# Patient Record
Sex: FEMALE | Race: WHITE | NOT HISPANIC OR LATINO | Employment: FULL TIME
[De-identification: names, ages, dates, MRNs, and addresses within clinical notes are randomized per-mention and may not be internally consistent; named-entity substitution may affect disease eponyms.]

---

## 2017-09-17 ENCOUNTER — HEALTH MAINTENANCE LETTER (OUTPATIENT)
Age: 37
End: 2017-09-17

## 2019-05-24 ENCOUNTER — TRANSFERRED RECORDS (OUTPATIENT)
Dept: HEALTH INFORMATION MANAGEMENT | Facility: CLINIC | Age: 39
End: 2019-05-24

## 2019-05-24 LAB
HPV ABSTRACT: NORMAL
PAP-ABSTRACT: ABNORMAL

## 2019-11-08 ENCOUNTER — HEALTH MAINTENANCE LETTER (OUTPATIENT)
Age: 39
End: 2019-11-08

## 2020-02-23 ENCOUNTER — HEALTH MAINTENANCE LETTER (OUTPATIENT)
Age: 40
End: 2020-02-23

## 2020-06-02 ENCOUNTER — OFFICE VISIT (OUTPATIENT)
Dept: FAMILY MEDICINE | Facility: CLINIC | Age: 40
End: 2020-06-02
Payer: COMMERCIAL

## 2020-06-02 VITALS
BODY MASS INDEX: 39.61 KG/M2 | SYSTOLIC BLOOD PRESSURE: 116 MMHG | TEMPERATURE: 98.3 F | WEIGHT: 209.8 LBS | HEIGHT: 61 IN | HEART RATE: 87 BPM | DIASTOLIC BLOOD PRESSURE: 78 MMHG | OXYGEN SATURATION: 98 %

## 2020-06-02 DIAGNOSIS — M79.661 PAIN OF RIGHT LOWER LEG: Primary | ICD-10-CM

## 2020-06-02 PROCEDURE — 99203 OFFICE O/P NEW LOW 30 MIN: CPT | Performed by: NURSE PRACTITIONER

## 2020-06-02 RX ORDER — NORETHINDRONE ACETATE AND ETHINYL ESTRADIOL 1MG-20(21)
1 KIT ORAL
COMMUNITY
Start: 2019-05-24 | End: 2020-07-08

## 2020-06-02 RX ORDER — AZELAIC ACID 0.15 G/G
GEL TOPICAL
COMMUNITY
Start: 2018-08-02 | End: 2020-12-15

## 2020-06-02 ASSESSMENT — MIFFLIN-ST. JEOR: SCORE: 1560.03

## 2020-06-02 NOTE — PATIENT INSTRUCTIONS
Will obtain ultrasound of the left leg to rule out blood clot.  If any worsening or new concerning symptoms (chest pain, palpitations or shortness of breath) please seek emergent.
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
## 2020-06-04 ENCOUNTER — ANCILLARY PROCEDURE (OUTPATIENT)
Dept: ULTRASOUND IMAGING | Facility: CLINIC | Age: 40
End: 2020-06-04
Attending: NURSE PRACTITIONER
Payer: COMMERCIAL

## 2020-06-04 DIAGNOSIS — M79.661 PAIN OF RIGHT LOWER LEG: ICD-10-CM

## 2020-06-04 PROCEDURE — 93971 EXTREMITY STUDY: CPT | Mod: RT
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
## 2020-07-10 ENCOUNTER — OFFICE VISIT (OUTPATIENT)
Dept: OBGYN | Facility: CLINIC | Age: 40
End: 2020-07-10
Payer: COMMERCIAL

## 2020-07-10 VITALS
OXYGEN SATURATION: 95 % | BODY MASS INDEX: 38.82 KG/M2 | HEART RATE: 79 BPM | WEIGHT: 205.6 LBS | DIASTOLIC BLOOD PRESSURE: 81 MMHG | HEIGHT: 61 IN | TEMPERATURE: 98.7 F | SYSTOLIC BLOOD PRESSURE: 132 MMHG

## 2020-07-10 DIAGNOSIS — Z30.41 ENCOUNTER FOR SURVEILLANCE OF CONTRACEPTIVE PILLS: ICD-10-CM

## 2020-07-10 DIAGNOSIS — C43.61 MALIGNANT MELANOMA OF RIGHT UPPER EXTREMITY INCLUDING SHOULDER (H): ICD-10-CM

## 2020-07-10 DIAGNOSIS — Z13.1 SCREENING FOR DIABETES MELLITUS: ICD-10-CM

## 2020-07-10 DIAGNOSIS — Z12.31 ENCOUNTER FOR SCREENING MAMMOGRAM FOR BREAST CANCER: ICD-10-CM

## 2020-07-10 DIAGNOSIS — Z13.6 CARDIOVASCULAR SCREENING; LDL GOAL LESS THAN 130: ICD-10-CM

## 2020-07-10 DIAGNOSIS — Z01.419 ENCOUNTER FOR GYNECOLOGICAL EXAMINATION WITHOUT ABNORMAL FINDING: Primary | ICD-10-CM

## 2020-07-10 LAB
CHOLEST SERPL-MCNC: 190 MG/DL
GLUCOSE SERPL-MCNC: 96 MG/DL (ref 70–99)
HDLC SERPL-MCNC: 48 MG/DL
LDLC SERPL CALC-MCNC: 108 MG/DL
NONHDLC SERPL-MCNC: 142 MG/DL
TRIGL SERPL-MCNC: 168 MG/DL

## 2020-07-10 PROCEDURE — 99396 PREV VISIT EST AGE 40-64: CPT | Performed by: NURSE PRACTITIONER

## 2020-07-10 PROCEDURE — 87624 HPV HI-RISK TYP POOLED RSLT: CPT | Performed by: NURSE PRACTITIONER

## 2020-07-10 PROCEDURE — 36415 COLL VENOUS BLD VENIPUNCTURE: CPT | Performed by: NURSE PRACTITIONER

## 2020-07-10 PROCEDURE — G0145 SCR C/V CYTO,THINLAYER,RESCR: HCPCS | Performed by: NURSE PRACTITIONER

## 2020-07-10 PROCEDURE — 82947 ASSAY GLUCOSE BLOOD QUANT: CPT | Performed by: NURSE PRACTITIONER

## 2020-07-10 PROCEDURE — 80061 LIPID PANEL: CPT | Performed by: NURSE PRACTITIONER

## 2020-07-10 RX ORDER — NORETHINDRONE ACETATE AND ETHINYL ESTRADIOL 1MG-20(21)
1 KIT ORAL DAILY
Qty: 84 TABLET | Refills: 3 | Status: SHIPPED | OUTPATIENT
Start: 2020-07-10 | End: 2021-03-29

## 2020-07-10 ASSESSMENT — PAIN SCALES - GENERAL: PAINLEVEL: NO PAIN (0)

## 2020-07-10 ASSESSMENT — MIFFLIN-ST. JEOR: SCORE: 1534.98

## 2020-07-10 NOTE — Clinical Note
Please abstract the following data from this visit with this patient into the appropriate field in Epic:    Tests that can be patient reported without a hard copy:    Pap smear done on this date: 5/24/19 (approximately), by this group: [facility], results were ASCUS and HPV neg.     Other Tests found in the patient's chart through Chart Review/Care Everywhere:    {Abstract Quality List (Optional):816307}    Note to Abstraction: If this section is blank, no results were found via Chart Review/Care Everywhere.

## 2020-07-15 LAB
COPATH REPORT: NORMAL
PAP: NORMAL

## 2020-07-17 LAB
FINAL DIAGNOSIS: NORMAL
HPV HR 12 DNA CVX QL NAA+PROBE: NEGATIVE
HPV16 DNA SPEC QL NAA+PROBE: NEGATIVE
HPV18 DNA SPEC QL NAA+PROBE: NEGATIVE
SPECIMEN DESCRIPTION: NORMAL
SPECIMEN SOURCE CVX/VAG CYTO: NORMAL

## 2020-07-21 PROBLEM — R87.612 LGSIL OF CERVIX OF UNDETERMINED SIGNIFICANCE: Status: RESOLVED | Noted: 2020-07-21 | Resolved: 2020-07-21

## 2020-07-21 PROBLEM — R87.612 LGSIL OF CERVIX OF UNDETERMINED SIGNIFICANCE: Status: ACTIVE | Noted: 2020-07-21

## 2020-07-24 ENCOUNTER — ANCILLARY PROCEDURE (OUTPATIENT)
Dept: MAMMOGRAPHY | Facility: CLINIC | Age: 40
End: 2020-07-24
Payer: COMMERCIAL

## 2020-07-24 DIAGNOSIS — Z12.31 ENCOUNTER FOR SCREENING MAMMOGRAM FOR BREAST CANCER: ICD-10-CM

## 2020-07-24 PROCEDURE — 77067 SCR MAMMO BI INCL CAD: CPT | Mod: TC

## 2020-08-21 ENCOUNTER — VIRTUAL VISIT (OUTPATIENT)
Dept: UROLOGY | Facility: CLINIC | Age: 40
End: 2020-08-21
Payer: COMMERCIAL

## 2020-08-21 DIAGNOSIS — N39.41 URGE INCONTINENCE: Primary | ICD-10-CM

## 2020-08-21 DIAGNOSIS — N39.46 MIXED INCONTINENCE: ICD-10-CM

## 2020-08-21 PROCEDURE — 99202 OFFICE O/P NEW SF 15 MIN: CPT | Mod: 95 | Performed by: PHYSICIAN ASSISTANT

## 2020-08-21 RX ORDER — MIRABEGRON 50 MG/1
50 TABLET, EXTENDED RELEASE ORAL DAILY
Qty: 30 TABLET | Refills: 11 | Status: SHIPPED | OUTPATIENT
Start: 2020-08-21 | End: 2021-10-11

## 2020-08-21 NOTE — PATIENT INSTRUCTIONS
UROLOGY CLINIC VISIT PATIENT INSTRUCTIONS    A prescription for mirabegron (Myrbetriq) was sent to your pharmacy to help with overactive bladder symptoms and urge incontinence. Take 1 pill per day at the same time every day.   -If the medication is not covered by your insurance or is expensive, please let us know so that we can send an alternative medication.     Physical Therapy Referral    Please call [phone] to make an appointment. They will also contact you to schedule most likely.     [facility] - [email or web address]  [facility] - [email or web address]    Locations:    [facility]   FS[facility] PT [facility]         Follow up in 3 months to reassess, sooner as needed.     If you have any issues, questions or concerns in the meantime, do not hesitate to contact us at [phone] or via [employer].     It was a pleasure meeting with you today.  Thank you for allowing me and my team the privilege of caring for you today.  YOU are the reason we are here, and I truly hope we provided you with the excellent service you deserve.  Please let us know if there is anything else we can do for you so that we can be sure you are leaving completely satisfied with your care experience.

## 2020-08-21 NOTE — PROGRESS NOTES
"Giselle Sauer is a 40 year old female who is being evaluated via a billable telephone visit.      The patient has been notified of following:     \"This telephone visit will be conducted via a call between you and your physician/provider. We have found that certain health care needs can be provided without the need for a physical exam.  This service lets us provide the care you need with a short phone conversation.  If a prescription is necessary we can send it directly to your pharmacy.  If lab work is needed we can place an order for that and you can then stop by our lab to have the test done at a later time.    Telephone visits are billed at different rates depending on your insurance coverage. During this emergency period, for some insurers they may be billed the same as an in-person visit.  Please reach out to your insurance provider with any questions.    If during the course of the call the physician/provider feels a telephone visit is not appropriate, you will not be charged for this service.\"    Patient has given verbal consent for Telephone visit?  Yes    What phone number would you like to be contacted at? [phone]    How would you like to obtain your AVS? Faye Helton LPN on 8/21/2020 at 11:21 AM    CC: urinary incontinence.    HPI: Ms. Giselle Sauer is a very pleasant 40 year old female who is being evaluated via billable telephone visit for urinary incontinence. This problem has been ongoing for the past year and is progressively worsening over the past 3-4 months. She describes urinary frequency, urgency, and urge incontinence. Also reports some stress incontinence with coughing, jumping, exercising, etc, though the urge incontinence is most bothersome. She has been avoiding certain types of exercise classes or going to the gym due to fear of leaking.     Ms. Sauer has tried Kegel exercises and double voiding in the past for her condition, which have not helped significantly. She " "denies any dysuria, hematuria, pyuria, hesitancy, intermittency, feeling of incomplete emptying, or any recent history of UTI's or stones. She does report some weight gain and has been trying to eat healthy and exercise in order to lose weight. She also drinks a lot of water and minimal caffeine.     She denies any pelvic pain or a vaginal bulge. Her aunt who is a nurse uses a \"ring\" to support her bladder and recommended she be evaluated by urology to discuss treatment options for her urgency and incontinence.     OBSTETRIC HISTORY:    She is , both .  Amenorrhea secondary to continuous OCP.     Past Medical History:   Diagnosis Date     Malignant melanoma (H)      Past Surgical History:   Procedure Laterality Date     C STABISM SURG,PREV EYE SURG,NOT MUSC       TONSILLECTOMY       Current Outpatient Medications   Medication Sig Dispense Refill     ASPIRIN 81 PO        azelaic acid (FINACEA) 15 % external gel APPLY TOPICALLY TO THE FACE TWICE DAILY       mirabegron (MYRBETRIQ) 50 MG 24 hr tablet Take 1 tablet (50 mg) by mouth daily 30 tablet 11     norethindrone-ethinyl estradiol (JUNEL ) 1-20 MG-MCG tablet Take 1 tablet by mouth daily 84 tablet 3     Allergies   Allergen Reactions     Sulfa Drugs Hives and Swelling     Swelling-throat       FAMILY HISTORY:   Family History   Problem Relation Age of Onset     Lipids Mother      Diabetes Maternal Grandmother        SOCIAL HISTORY:   Social History     Tobacco Use     Smoking status: Never Smoker     Smokeless tobacco: Never Used   Substance Use Topics     Alcohol use: No     Drug use: No       ROS: A comprehensive 14 point ROS was obtained and is positive for weight gain.  The remainder of the ROS is negative except for that outlined above in the HPI.      ASSESSMENT and PLAN:    Ms. Giselle Sauer is a 40 year old female with urinary frequency, urgency, and mixed urinary incontinence (urge predominant).  Different management options were discussed " today with the patient including observation, weight loss, dietary modifications, Kegel exercises, dedicated pelvic floor physical therapy with biofeedback, medication therapy including anticholinergics and Myrbetriq, pessary, or possible surgery. As she is primarily bothered by the frequency, urgency, and UUI, she elects for the following:   -Trial of mirabegron 50 mg once daily. Side effects reviewed.   -Referral to pelvic floor PT through [facility].   -Limit bladder irritants.     Follow up in 3 months to recheck, sooner as needed     Phone call duration: 24 minutes    Carole Nixon PA-C  Department of Urology

## 2020-08-24 ENCOUNTER — TELEPHONE (OUTPATIENT)
Dept: UROLOGY | Facility: CLINIC | Age: 40
End: 2020-08-24

## 2020-08-24 NOTE — TELEPHONE ENCOUNTER
8/24 Provided phone number [phone] to schedule 3 month virtual follow up around 11/24/20.       Theresa Freitas   Procedure    Ortho/Sports Med/Pod/Ent/Eye/Surgical Specialties  [facility] [place]   [phone]

## 2020-12-03 NOTE — PROGRESS NOTES
"Urology Telephone Visit - Follow-up    Reason for visit: follow up on urinary incontinence    HPI: Ms. Giselle Sauer is a very pleasant 40 year old female who is seen today via billable telephone visit for follow up of urinary incontinence. Seen in initial consultation via virtual visit on 2020. HPI from this date as follows:    Ms. Giselle Sauer is a very pleasant 40 year old female who is being evaluated via billable telephone visit for urinary incontinence. This problem has been ongoing for the past year and is progressively worsening over the past 3-4 months. She describes urinary frequency, urgency, and urge incontinence. Also reports some stress incontinence with coughing, jumping, exercising, etc, though the urge incontinence is most bothersome. She has been avoiding certain types of exercise classes or going to the gym due to fear of leaking.      Ms. Sauer has tried Kegel exercises and double voiding in the past for her condition, which have not helped significantly. She denies any dysuria, hematuria, pyuria, hesitancy, intermittency, feeling of incomplete emptying, or any recent history of UTI's or stones. She does report some weight gain and has been trying to eat healthy and exercise in order to lose weight. She also drinks a lot of water and minimal caffeine.      She denies any pelvic pain or a vaginal bulge. Her aunt who is a nurse uses a \"ring\" to support her bladder and recommended she be evaluated by urology to discuss treatment options for her urgency and incontinence.      OBSTETRIC HISTORY:    She is , both .  Amenorrhea secondary to continuous OCP.       At that time, she was started on a trial of mirabegron 50 mg daily and given a referral to pelvic floor physical therapy.     Today, she reports some improvement with the medication. No noticeable side effects. Did not attend physical therapy as she would have to pay out of pocket. Feels as though she is managing okay at this " time, though notes some worsening of her symptoms with the cold weather. Wonders about home exercises/bladder retraining techniques which apparently did not get sent to her [employer] after her last visit.     A/P  40 year old female with urinary urgency and mixed incontinence. Has noted some improvement with mirabegron. Did not attend PFPT as she would have to pay out of pocket. Feels to be managing okay at this point, but would like the information for home exercises which we discussed last time.  -Will resend bladder retraining techniques/Kegel exercise handout via [employer] and mail to ensure she gets it.  -Continue mirabegron 50 mg daily. Can always try stopping if she feels it's no longer needed.   -Follow up as needed or if symptoms continue to be bothersome in the future. If continues to have bothersome frequency, urgency, leakage, would recommend next visit be in person for exam to better assess the pelvic floor.     Phone call duration: 10 minutes    Carole Nixon PA-C  Department of Urology

## 2020-12-04 ENCOUNTER — VIRTUAL VISIT (OUTPATIENT)
Dept: UROLOGY | Facility: CLINIC | Age: 40
End: 2020-12-04
Payer: COMMERCIAL

## 2020-12-04 DIAGNOSIS — R39.15 URINARY URGENCY: ICD-10-CM

## 2020-12-04 DIAGNOSIS — N39.46 MIXED INCONTINENCE: Primary | ICD-10-CM

## 2020-12-04 PROCEDURE — 99212 OFFICE O/P EST SF 10 MIN: CPT | Mod: 95 | Performed by: PHYSICIAN ASSISTANT

## 2020-12-04 NOTE — PATIENT INSTRUCTIONS
UROLOGY CLINIC VISIT PATIENT INSTRUCTIONS    Continue mirabegron (Myrbetriq) 50 mg once daily. You can always try stopping the medication in the future if you feel that you no longer need it.    Follow up with urology if your symptoms continue to get worse or you would like to discuss other treatments going forward.     Bladder Retraining  In this packet, you will learn 3 steps to help you improve your bladder control. If you have any questions regarding any of these steps once you are home, please feel free to call the office.   The 3 steps you will learn are:   1. Double Voiding   2. Fluid Guidelines  3. Timed Voiding   Double Voiding   A technique called double voiding can be used to help ensure that you have fully emptied your bladder while on the toilet. The main idea behind double voiding is to try to void two or even three times during each trip to the bathroom.  By doing this you can reduce residual urine volumes and minimize your chance of having an accident, an infection, or leakage later on.   The technique is simple.  Some people find that they can void, remain on the toilet for a rest period of two to five to ten minutes, and void again.  Others find it useful to void, then stand up and sit back down and attempt to void again.   You can also compress the bladder in order to empty more fully.  To do the Crede maneuver, press firmly with one hand (or both hands) directly into the abdomen over the bladder.  You may also find it helpful to lean forward or rock while sitting on the toilet in order to help empty the bladder better.  Fluid Guidelines   Managing your fluid intake can also help you improve your bladder control.  It is VERY important that you drink at least 5 to 7 glasses of fluid each day. The bulk of this fluid should be water. Drinking appropriate amounts of fluid daily and emptying your bladder at regular intervals helps decrease bladder infections. Managing your problem by restricting fluid  intake is counterproductive, and NOT recommended.     Suggestions Regarding Fluid Intake    Try to spread your fluid consumption out over the course of the day rather than consuming large amounts at one sitting and then going long periods of time without drinking.     Try to minimize fluid consumption after your evening meal.     Try to minimize caffeine and alcohol consumption. Use only decaffeinated coffee, tea or soda when possible.      Timed Voiding    It might be a good idea to start this approach to managing your problem on a weekend or when you plan to be at home or near a bathroom facility. The purpose of timed voiding is to gradually:   1. increase the amount of time between emptying your bladder   2. increase the amount of fluid your bladder can hold, and hopefully,   3. diminish the sense of urgency and/or leakage associated with your problem.     Week 1 - After awakening, empty your bladder every half-hour on the hour (even if you do NOT feel the need to go). Make sure you are drinking frequently. During the night only go to the bathroom if you waken and find it necessary     Week 2 - Increase the time between emptying your bladder to once per hour, following the above fluid and night instructions.     Week 3 - Increase the time between emptying your bladder to every 1 1/2 hours, following the above fluid and night instructions.   Week 4 - Increase the time between emptying your bladder to every 2 hours, following the above fluid and night instructions.  Work up to voiding every 3 to 3   hours if you can.     If you can already hold your bladder longer than 1/2 hour, you do not need to start at Week 1. Start at the point that is appropriate for you and work up from there. Just remember to 1) increase your voiding intervals by no more than 30 minutes at a time, 2) void regularly even if you do not feel the need to go, and 3) during the night, only go to the bathroom if you waken and find it necessary.    You will be the best  of how quickly you can advance to the next step. These instructions are an outline which you can modify (for example, you may find it more comfortable to stretch from 1 to 1 1/4 hours).   You may also increase the pace of this sample schedule, depending on your individual symptoms and bladder capacity. For example, you may increase the hourly increments every 5 days, instead of every 7 days.     Don t Get Discouraged  This program works! The keys to success are self-motivation and gradual increases in the time interval between voids. If you try to progress too rapidly, you will exceed your capabilities and become frustrated.    Some Additional Behavioral Techniques to Help Bladder Control    Do not rush to the bathroom. Try to be calm and maintain control. Rushing to the bathroom can intensify the urge for the bladder to contract.    Do several quick contractions of the pelvic floor muscles. Use effort to keep from leaking. If possible, sit down for direct pressure on the pelvic floor.    Relax. If you have practiced diaphragmatic breathing in the past, use that skill to relax. (Take slow, deep breaths through your nose, and then slowly breathe out through pursed lips.) Use distraction techniques to try and make the urinary urge go away.    When you feel the urge subside, walk slowly and normally to the bathroom. You can repeat the above steps to gain control if the urge returns.    You can slowly proceed to the toilet room to empty your bladder once the urge has subsided.      Patient Education     Treating Incontinence in Women: Nonsurgical Methods     The best treatment for you will depend on the type of incontinence you have. Your symptoms, age, and any underlying problems that are found also affect your treatment. Some types of incontinence may over time require surgery. But nonsurgical treatments may be effective in many cases. Nonsurgical treatments include lifestyle changes,  muscle-strengthening exercises, and medicines.   Nonsurgical treatments  Treatment for stress urinary incontinence includes:     Bladder training    Lifestyle changes such as weight loss and increased activity if incontinence is due to being overweight    Medicines, if bladder training has not helped    Pelvic floor muscle exercises  Lifestyle changes    Losing weight. Excess weight puts extra pressure on the pelvic floor muscles. Exercising and eating right can help you lose weight. This helps other treatments work better.    Making certain diet changes. Some foods may make you need to urinate more, so it may be good not to have them. These include caffeinated drinks and alcohol. Ask your healthcare provider if these or other diet changes might be helpful.    Quitting smoking. Smoking can lead to a long-term (chronic) cough that strains pelvic floor muscles. Smoking may also damage the bladder and urethra.    Pelvic floor muscle exercises  There are exercises you can do to help strengthen your pelvic floor muscles. The pelvic floor muscles act as a sling to help hold the bladder and urethra in place. These muscles also help keep the urethra closed. Weak pelvic floor muscles may allow urine to leak. To strengthen the pelvic floor muscles, do the exercises daily. In a few months, the muscles will be stronger and tighter. This can help prevent urine leakage.   [employer] last reviewed this educational content on 9/1/2019 2000-2020 [employer]. [address] [place] [address], [place] [address]. All rights reserved. This information is not intended as a substitute for professional medical care. Always follow your healthcare professional's instructions.           Patient Education     Kegel Exercises  Kegel exercises don t need special clothing or equipment. They re easy to learn and simple to do. And if you do them right, no one can tell you re doing them, so they can be done almost anywhere. Your healthcare  provider, nurse, or physical therapist can answer any questions you have and help you get started.    A weak pelvic floor  The pelvic floor muscles may weaken due to aging, pregnancy and vaginal childbirth, injury, surgery, chronic cough, or lack of exercise. If the pelvic floor is weak, your bladder and other pelvic organs may sag out of place. The urethra may also open too easily and allow urine to leak out. Kegel exercises can help you strengthen your pelvic floor muscles. Then they can better support the pelvic organs and control urine flow.  How Kegel exercises are done  Try each of the Kegel exercises described below. When you re doing them, try not to move your leg, buttock, or stomach muscles:    Contract as if you were stopping your urine stream. But do it when you re not urinating.    Tighten your rectum as if trying not to pass gas. Contract your anus, but don t move your buttocks.    You may place a finger or 2 in the vagina and squeeze your finger with your vagina to learn which muscles to tighten.  Try to hold each Kegel for a slow count to 5. You probably won t be able to hold them for that long at first. But keep practicing. It will get easier as your pelvic floor gets stronger. Eventually, special weights that you place in your vagina may be recommended to help make your Kegels even more effective. Visit your healthcare provider if you have difficulties doing Kegel exercises.  Helpful hints  Here are some tips to follow:    Do your Kegels as often as you can. The more you do them, the faster you ll feel the results.    Pick an activity you do often as a reminder. For instance, do your Kegels every time you sit down.    Tighten your pelvic floor before you sneeze, get up from a chair, cough, laugh, or lift. This protects your pelvic floor from injury and can help prevent urine leakage.   [employer] last reviewed this educational content on 10/1/2017    6725-4821 [employer]. [address] [place] [address]  [facility], [place] [address]. All rights reserved. This information is not intended as a substitute for professional medical care. Always follow your healthcare professional's instructions.           If you have any issues, questions or concerns in the meantime, do not hesitate to contact us at [phone] or via [employer].     It was a pleasure meeting with you today.  Thank you for allowing me and my team the privilege of caring for you today.  YOU are the reason we are here, and I truly hope we provided you with the excellent service you deserve.  Please let us know if there is anything else we can do for you so that we can be sure you are leaving completely satisfied with your care experience.

## 2020-12-04 NOTE — PROGRESS NOTES
"Giselle Sauer is a 40 year old female who is being evaluated via a billable telephone visit.      The patient has been notified of following:     \"This telephone visit will be conducted via a call between you and your physician/provider. We have found that certain health care needs can be provided without the need for a physical exam.  This service lets us provide the care you need with a short phone conversation.  If a prescription is necessary we can send it directly to your pharmacy.  If lab work is needed we can place an order for that and you can then stop by our lab to have the test done at a later time.    Telephone visits are billed at different rates depending on your insurance coverage. During this emergency period, for some insurers they may be billed the same as an in-person visit.  Please reach out to your insurance provider with any questions.    If during the course of the call the physician/provider feels a telephone visit is not appropriate, you will not be charged for this service.\"    Patient has given verbal consent for Telephone visit?  Yes    What phone number would you like to be contacted at? [phone]    How would you like to obtain your AVS? Faye Helms CMA        "

## 2020-12-15 ENCOUNTER — OFFICE VISIT (OUTPATIENT)
Dept: DERMATOLOGY | Facility: CLINIC | Age: 40
End: 2020-12-15
Attending: NURSE PRACTITIONER
Payer: COMMERCIAL

## 2020-12-15 DIAGNOSIS — L81.4 SOLAR LENTIGO: ICD-10-CM

## 2020-12-15 DIAGNOSIS — D18.01 CHERRY ANGIOMA: ICD-10-CM

## 2020-12-15 DIAGNOSIS — D22.9 MULTIPLE BENIGN NEVI: ICD-10-CM

## 2020-12-15 DIAGNOSIS — L71.9 ROSACEA: ICD-10-CM

## 2020-12-15 DIAGNOSIS — L57.8 SUN-DAMAGED SKIN: ICD-10-CM

## 2020-12-15 DIAGNOSIS — Z85.820 HISTORY OF MELANOMA: Primary | ICD-10-CM

## 2020-12-15 PROCEDURE — 99203 OFFICE O/P NEW LOW 30 MIN: CPT | Performed by: DERMATOLOGY

## 2020-12-15 RX ORDER — AZELAIC ACID 0.15 G/G
GEL TOPICAL
Qty: 50 G | Refills: 11 | Status: SHIPPED | OUTPATIENT
Start: 2020-12-15 | End: 2021-10-11

## 2020-12-15 ASSESSMENT — PAIN SCALES - GENERAL: PAINLEVEL: NO PAIN (0)

## 2020-12-15 NOTE — PROGRESS NOTES
"[facility] Dermatology Note    Dermatology Problem List:  Needs yearly skin checks  1. Melanoma, right upper arm, stage IA, excised 9/10/15  -Treated at [facility]. Breslows depth 0.4mm, no ulceration, 0 mitoses/mm2  2. Atypical nevi history  -No biopsy reports. Perhaps clinically identified.  3. Rosacea  - Refilled azelaic acid 15% gel 12/15/2020  4. History of benign biopsy  -Right upper arm, dermatofibroma, s/p punch biopsy 2/10/17    Encounter Date: Dec 15, 2020    CC:  Chief Complaint   Patient presents with     Derm Problem     Giselle is here today for a skin check, pt states \"concern on back by bra line, possibly new spots in scar\"       History of Present Illness:  Ms. Giselle Sauer is a 40 year old female with history of stage IA melanoma on the right upper arm in 2015 presents for skin check.    She is new to our department. She was seen at / previously. Last skin check 11/1/2019. History of melanoma as stated above on right upper arm. No other skin cancer or atypical mole history to her knowledge.    Today, she notes concerns about a spot on her shoulder. She thinks this could be a new spot in a previous surgical scar. It is pink in color. Her children thought one mole on her back might look atypical, but she cannot see it to know for sure.    Denies any tender, nonhealing, bleeding, or changing pigmented lesions.    Feeling well today, no fevers, chills, cough, loss of appetite. She notes she has seen PCP for shortness of breath over last 18 months. Workup ongoing.    Requests refills of finacea today for rosacea. This typically works well for her.    No other concerns addressed today.    Past Medical History:   Patient Active Problem List   Diagnosis     CARDIOVASCULAR SCREENING; LDL GOAL LESS THAN 160     Obesity     Malignant melanoma (H)     Past Medical History:   Diagnosis Date     Malignant melanoma (H)      Past Surgical History:   Procedure Laterality Date     C STABISM " SURG,PREV EYE SURG,NOT MUSC       TONSILLECTOMY         Social History:  Patient reports that she has never smoked. She has never used smokeless tobacco. She reports that she does not drink alcohol or use drugs.   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 Has an 19 yo and 15 yo.    Family History:  Negative for skin cancer.    Medications:  Current Outpatient Medications   Medication Sig Dispense Refill     ASPIRIN 81 PO Take by mouth daily        azelaic acid (FINACEA) 15 % external gel APPLY TOPICALLY TO THE FACE TWICE DAILY       mirabegron (MYRBETRIQ) 50 MG 24 hr tablet Take 1 tablet (50 mg) by mouth daily 30 tablet 11     norethindrone-ethinyl estradiol (JUNEL FE 1/20) 1-20 MG-MCG tablet Take 1 tablet by mouth daily 84 tablet 3       Allergies   Allergen Reactions     Sulfa Drugs Hives and Swelling     Swelling-throat       Review of Systems:  -Constitutional: The patient is feeling generally well. Occasional shortness of breath past 16 months. No fevers/chills, cough, unintended weight loss.  -Skin: As above in HPI. No additional skin concerns.    Physical exam:  Vitals: There were no vitals taken for this visit.  GEN: This is a well developed, well-nourished female in no acute distress, in a pleasant mood.    SKIN: Total skin excluding the undergarment areas was performed. The exam included the head/face, neck, both arms, chest, back, abdomen, both legs, buttocks, digits and/or nails. Declined genital concrns.  - Cuellar Type II  - Well healed linear scar on right upper arm. There is a visible telangiectasias in central part of the scar. No nodularity (patient concern).  - Scattered brown macules on sun exposed areas.  - Clinically apparent telangiectasias on the cheek and chin.  - There are dome shaped bright red papules on the trunk.  - Multiple regular brown pigmented macules and papules are identified across the body. About 40 nevi in all. Specifically on the back,  many have congenital appearance to them with more patchy reticular network. None have significant enough atypia to warrant biopsy today.  - There is a firm tan/flesh colored papule that dimples with lateral pressure on the left thigh.  - No other lesions of concern on areas examined.   LYMPH NODES: No submandibular, cervical, supraclavicular, axillary, or inguinal lymphadenopathy palpable on examination.       Impression/Plan:    1. Melanoma, stage IA, right upper arm. No evidence of recurrence. Discussed risk of melanoma recurrence (with local or distant disease) and risk of additional primary melanomas. Explained routine schedule for follow up examinations in office and need for self skin checks monthly.  - ABCDs of melanoma were discussed and self skin checks were advised.   - Sun precaution was advised including the use of sunscreens of SPF 30 or higher, sun protective clothing, and avoidance of tanning beds.  - Recommended yearly dental, gyn, and ophthalmology examinations.  - Recommended first degree relatives get yearly skin exams as well.    2. Sun damaged skin with solar lentigines  - Recommend sunscreens SPF #30 or greater, protective clothing and avoidance of tanning beds.    3. Benign findings including: cherry angioma, dermatofibroma  - No further intervention required. Patient to report changes.   - Patient reassured of the benign nature of these lesions.    4. Multiple clinically benign nevi  - No further intervention required. Patient to report changes.   - Patient reassured of the benign nature of these lesions.    5. Rosacea  - Refilled azelaic acid 15% gel. Discussed that insurance coverage of this has been poor lately. Recommended 10% OTC versions if not covered.    CC KARYNA Coffey CNP  [address] [place] [address] on close of this encounter.  Follow-up in 1 year for annual skin check, earlier for new or changing lesions.     Staff Involved:  Staff and scribe    Scribe  Disclosure:   I, Say Méndez, am serving as a scribe to document services personally performed by this physician, Dr. Kaitlynn Burnham, based on data collection and the provider's statements to me.     Provider Disclosure:   The documentation recorded by the scribe accurately reflects the services I personally performed and the decisions made by me.    Kaitlynn Burnham MD    Department of Dermatology  [facility]: Phone: [phone], Fax:[phone]  [facility]: Phone: [phone], Fax: [phone]

## 2020-12-15 NOTE — PATIENT INSTRUCTIONS
- We have refilled your Finacea for rosacea.  - If Finacea is not covered by insurance, you can purchase OTC azelaic acid. Some brands include Shelby's Choice, which you can get from [employer].    - I recommend informing your dentist, , OB and ophthalmologist of your previous melanoma diagnosis.  - Also, recommend having your children get yearly skin exams when they turn 18. Your siblings should also get yearly skin exams.

## 2020-12-15 NOTE — NURSING NOTE
"Giselle SARAH Sauer's goals for this visit include:   Chief Complaint   Patient presents with     Derm Problem     Giselle is here today for a skin check, pt states \"concern on back by bra line, possibly new spots in scar\"       She requests these members of her care team be copied on today's visit information:     PCP: No Ref-Primary, Physician    Referring Provider:  KARYNA Coffey CNP  [address] [place] [address]    There were no vitals taken for this visit.    Do you need any medication refills at today's visit? No    January Donaldson LPN      "
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
## 2021-03-26 DIAGNOSIS — Z30.41 ENCOUNTER FOR SURVEILLANCE OF CONTRACEPTIVE PILLS: ICD-10-CM

## 2021-03-26 RX ORDER — NORETHINDRONE ACETATE/ETHINYL ESTRADIOL AND FERROUS FUMARATE 1MG-20(21)
KIT ORAL
OUTPATIENT
Start: 2021-03-26

## 2021-03-26 NOTE — TELEPHONE ENCOUNTER
"Requested Prescriptions   Pending Prescriptions Disp Refills     EMILE FE 1/20 1-20 MG-MCG tablet [Pharmacy Med Name: EMILE FE 1/20 1-20MG-MCG TABS] 84 tablet 3     Sig: TAKE ONE TABLET BY MOUTH ONCE DAILY       Contraceptives Protocol Passed - 3/26/2021  1:30 PM        Passed - Patient is not a current smoker if age is 35 or older        Passed - Recent (12 mo) or future (30 days) visit within the authorizing provider's specialty     Patient has had an office visit with the authorizing provider or a provider within the authorizing providers department within the previous 12 mos or has a future within next 30 days. See \"Patient Info\" tab in inbasket, or \"Choose Columns\" in Meds & Orders section of the refill encounter.              Passed - Medication is active on med list        Passed - No active pregnancy on record        Passed - No positive pregnancy test in past 12 months           Last seen 7/10/2020 for annual exam    Last prescribed 7/10/2020 for 84 tablets with 3 refills.    Refills remain at patient's pharmacy. Refill not appropriate at this time, because there are additional refills remaining on current presciption    RN unable to close encounter w/o declining medication. RN routing to provider to close encounter.    Carolina Malik RN on 3/26/2021 at 1:36 PM        "

## 2021-03-29 DIAGNOSIS — Z30.41 ENCOUNTER FOR SURVEILLANCE OF CONTRACEPTIVE PILLS: ICD-10-CM

## 2021-03-29 RX ORDER — NORETHINDRONE ACETATE AND ETHINYL ESTRADIOL 1MG-20(21)
1 KIT ORAL DAILY
Qty: 84 TABLET | Refills: 1 | Status: SHIPPED | OUTPATIENT
Start: 2021-03-29 | End: 2021-08-02

## 2021-03-29 NOTE — TELEPHONE ENCOUNTER
The script dated 7/13/20 was filled 7/13/20 #84 for 84 days, 9/15/20 #84 for 84 days, 11/18/20 #84 for 84 days and 1/20/21 #84 for 84 days. Patient states she will be out of the end of this week.      Carole Andersonbie    Pharmacy Technician  [facility]

## 2021-08-02 DIAGNOSIS — Z30.41 ENCOUNTER FOR SURVEILLANCE OF CONTRACEPTIVE PILLS: ICD-10-CM

## 2021-08-02 RX ORDER — NORETHINDRONE ACETATE/ETHINYL ESTRADIOL AND FERROUS FUMARATE 1MG-20(21)
KIT ORAL
Qty: 84 TABLET | Refills: 0 | Status: SHIPPED | OUTPATIENT
Start: 2021-08-02 | End: 2023-11-10

## 2021-08-02 NOTE — TELEPHONE ENCOUNTER
"Requested Prescriptions   Pending Prescriptions Disp Refills     EMILE FE 1/20 1-20 MG-MCG tablet [Pharmacy Med Name: EMILE FE 1/20 1-20MG-MCG TABS] 84 tablet 1     Sig: TAKE 1 TABLET BY MOUTH DAILY       Contraceptives Protocol Passed - 8/2/2021  8:52 AM        Passed - Patient is not a current smoker if age is 35 or older        Passed - Recent (12 mo) or future (30 days) visit within the authorizing provider's specialty     Patient has had an office visit with the authorizing provider or a provider within the authorizing providers department within the previous 12 mos or has a future within next 30 days. See \"Patient Info\" tab in inbasket, or \"Choose Columns\" in Meds & Orders section of the refill encounter.              Passed - Medication is active on med list        Passed - No active pregnancy on record        Passed - No positive pregnancy test in past 12 months           Pt last seen 7/10/2020 for annual exam    Last prescribed 3/29/2021 for 84 tablets with 1 refill.    Pt is due for annual exam, has appt scheduled for 8/13/2021.    RN sent mychart to pt asking if she will be out of medication before this appt. Pt states she will run out of medication on 8/7/2021. RN sent tawana refill to get pt to her appt.    Carolina Malik RN on 8/2/2021 at 9:08 AM          "

## 2021-09-25 ENCOUNTER — HEALTH MAINTENANCE LETTER (OUTPATIENT)
Age: 41
End: 2021-09-25

## 2021-10-11 ENCOUNTER — OFFICE VISIT (OUTPATIENT)
Dept: OBGYN | Facility: CLINIC | Age: 41
End: 2021-10-11
Payer: COMMERCIAL

## 2021-10-11 VITALS
HEART RATE: 75 BPM | DIASTOLIC BLOOD PRESSURE: 77 MMHG | HEIGHT: 61 IN | TEMPERATURE: 99.1 F | SYSTOLIC BLOOD PRESSURE: 147 MMHG | BODY MASS INDEX: 41.39 KG/M2 | WEIGHT: 219.2 LBS | OXYGEN SATURATION: 97 %

## 2021-10-11 DIAGNOSIS — Z01.419 ENCOUNTER FOR GYNECOLOGICAL EXAMINATION WITHOUT ABNORMAL FINDING: Primary | ICD-10-CM

## 2021-10-11 DIAGNOSIS — Z30.41 ENCOUNTER FOR SURVEILLANCE OF CONTRACEPTIVE PILLS: ICD-10-CM

## 2021-10-11 DIAGNOSIS — Z13.6 CARDIOVASCULAR SCREENING; LDL GOAL LESS THAN 130: ICD-10-CM

## 2021-10-11 DIAGNOSIS — Z12.31 BREAST CANCER SCREENING BY MAMMOGRAM: ICD-10-CM

## 2021-10-11 DIAGNOSIS — Z11.59 NEED FOR HEPATITIS C SCREENING TEST: ICD-10-CM

## 2021-10-11 DIAGNOSIS — Z13.1 SCREENING FOR DIABETES MELLITUS: ICD-10-CM

## 2021-10-11 PROCEDURE — 99396 PREV VISIT EST AGE 40-64: CPT | Performed by: NURSE PRACTITIONER

## 2021-10-11 RX ORDER — NORETHINDRONE ACETATE AND ETHINYL ESTRADIOL 1MG-20(21)
1 KIT ORAL DAILY
Qty: 84 TABLET | Refills: 3 | Status: CANCELLED | OUTPATIENT
Start: 2021-10-11

## 2021-10-11 RX ORDER — ACETAMINOPHEN AND CODEINE PHOSPHATE 120; 12 MG/5ML; MG/5ML
0.35 SOLUTION ORAL DAILY
Qty: 84 TABLET | Refills: 3 | Status: SHIPPED | OUTPATIENT
Start: 2021-10-11 | End: 2022-09-07

## 2021-10-11 ASSESSMENT — PAIN SCALES - GENERAL: PAINLEVEL: NO PAIN (0)

## 2021-10-11 ASSESSMENT — MIFFLIN-ST. JEOR: SCORE: 1591.66
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
## 2022-09-05 DIAGNOSIS — Z30.41 ENCOUNTER FOR SURVEILLANCE OF CONTRACEPTIVE PILLS: ICD-10-CM

## 2022-09-07 RX ORDER — ACETAMINOPHEN AND CODEINE PHOSPHATE 120; 12 MG/5ML; MG/5ML
0.35 SOLUTION ORAL DAILY
Qty: 84 TABLET | Refills: 0 | Status: SHIPPED | OUTPATIENT
Start: 2022-09-07 | End: 2022-11-30

## 2022-09-07 NOTE — TELEPHONE ENCOUNTER
"Requested Prescriptions   Pending Prescriptions Disp Refills     norethindrone (MICRONOR) 0.35 MG tablet [Pharmacy Med Name: NORETHINDRONE 0.35MG TABS] 84 tablet 3     Sig: TAKE 1 TABLET (0.35 MG) BY MOUTH DAILY       Contraceptives Protocol Passed - 9/5/2022  4:25 PM        Passed - Patient is not a current smoker if age is 35 or older        Passed - Recent (12 mo) or future (30 days) visit within the authorizing provider's specialty     Patient has had an office visit with the authorizing provider or a provider within the authorizing providers department within the previous 12 mos or has a future within next 30 days. See \"Patient Info\" tab in inbasket, or \"Choose Columns\" in Meds & Orders section of the refill encounter.              Passed - Medication is active on med list        Passed - No active pregnancy on record        Passed - No positive pregnancy test in past 12 months           Last Written Prescription Date:  10/11/21  Last Fill Quantity: 84,  # refills: 3   Last office visit: 10/11/2021 with prescribing provider:  Whitley   Future Office Visit:   Next 5 appointments (look out 90 days)    Sep 29, 2022 12:45 PM  (Arrive by 12:30 PM)  Return Visit with KARYNA Faustin [facility] ([facility] [address] [facility] [address]  [phone]           Prescription approved per [facility] Refill Protocol.    Andreea Ashley RN on 9/7/2022 at 1:47 PM        "

## 2022-11-29 ENCOUNTER — MYC MEDICAL ADVICE (OUTPATIENT)
Dept: OBGYN | Facility: CLINIC | Age: 42
End: 2022-11-29

## 2022-11-29 DIAGNOSIS — Z30.41 ENCOUNTER FOR SURVEILLANCE OF CONTRACEPTIVE PILLS: ICD-10-CM

## 2022-11-29 NOTE — TELEPHONE ENCOUNTER
"Requested Prescriptions   Pending Prescriptions Disp Refills     norethindrone (MICRONOR) 0.35 MG tablet [Pharmacy Med Name: NORETHINDRONE 0.35MG TABS] 84 tablet 0     Sig: TAKE 1 TABLET (0.35 MG) BY MOUTH DAILY       Contraceptives Protocol Failed - 11/29/2022 12:15 PM        Failed - Recent (12 mo) or future (30 days) visit within the authorizing provider's specialty     Patient has had an office visit with the authorizing provider or a provider within the authorizing providers department within the previous 12 mos or has a future within next 30 days. See \"Patient Info\" tab in inbasket, or \"Choose Columns\" in Meds & Orders section of the refill encounter.              Passed - Patient is not a current smoker if age is 35 or older        Passed - Medication is active on med list        Passed - No active pregnancy on record        Passed - No positive pregnancy test in past 12 months           Pt's last yearly physical was on 10/11/21.      Sending pt a mychart reminder that she is due for a yearly physical.    Josselyn Poe RN    "

## 2022-11-30 RX ORDER — ACETAMINOPHEN AND CODEINE PHOSPHATE 120; 12 MG/5ML; MG/5ML
0.35 SOLUTION ORAL DAILY
Qty: 84 TABLET | Refills: 0 | Status: SHIPPED | OUTPATIENT
Start: 2022-11-30 | End: 2023-02-21

## 2022-11-30 NOTE — TELEPHONE ENCOUNTER
Medication is being filled for 1 time refill only due to:  Patient needs to be seen because it has been more than one year since last visit.  Pt is scheduled for an appt in January, 2023.    Josselyn Poe RN

## 2022-11-30 NOTE — TELEPHONE ENCOUNTER
Pt read the [employer] reminder to schedule an appt yesterday but she has not yet scheduled.    Josselyn Poe RN

## 2023-02-21 DIAGNOSIS — Z30.41 ENCOUNTER FOR SURVEILLANCE OF CONTRACEPTIVE PILLS: ICD-10-CM

## 2023-02-21 RX ORDER — ACETAMINOPHEN AND CODEINE PHOSPHATE 120; 12 MG/5ML; MG/5ML
0.35 SOLUTION ORAL DAILY
Qty: 84 TABLET | Refills: 0 | Status: SHIPPED | OUTPATIENT
Start: 2023-02-21 | End: 2023-05-17

## 2023-02-21 NOTE — TELEPHONE ENCOUNTER
"Requested Prescriptions   Pending Prescriptions Disp Refills     norethindrone (MICRONOR) 0.35 MG tablet [Pharmacy Med Name: NORETHINDRONE 0.35MG TABS] 84 tablet 0     Sig: TAKE 1 TABLET (0.35 MG) BY MOUTH DAILY       Contraceptives Protocol Failed - 2/21/2023  4:28 AM        Failed - Recent (12 mo) or future (30 days) visit within the authorizing provider's specialty     Patient has had an office visit with the authorizing provider or a provider within the authorizing providers department within the previous 12 mos or has a future within next 30 days. See \"Patient Info\" tab in inbasket, or \"Choose Columns\" in Meds & Orders section of the refill encounter.              Passed - Patient is not a current smoker if age is 35 or older        Passed - Medication is active on med list        Passed - No active pregnancy on record        Passed - No positive pregnancy test in past 12 months           Pt has not been seen since 10/11/21.  She was scheduled on 1/27/23 but no showed the appt.  However, pt is scheduled for a physical with Whitley on 4/14/23.    Medication is being filled for 1 time refill only due to:  Patient needs to be seen because it has been more than one year since last visit.    Josselyn Poe RN        "

## 2023-04-22 ENCOUNTER — HEALTH MAINTENANCE LETTER (OUTPATIENT)
Age: 43
End: 2023-04-22

## 2023-05-17 DIAGNOSIS — Z30.41 ENCOUNTER FOR SURVEILLANCE OF CONTRACEPTIVE PILLS: ICD-10-CM

## 2023-05-17 RX ORDER — ACETAMINOPHEN AND CODEINE PHOSPHATE 120; 12 MG/5ML; MG/5ML
0.35 SOLUTION ORAL DAILY
Qty: 84 TABLET | Refills: 0 | Status: SHIPPED | OUTPATIENT
Start: 2023-05-17 | End: 2023-08-10

## 2023-05-17 NOTE — TELEPHONE ENCOUNTER
"Requested Prescriptions   Pending Prescriptions Disp Refills     norethindrone (MICRONOR) 0.35 MG tablet [Pharmacy Med Name: NORETHINDRONE 0.35MG TABS] 84 tablet 0     Sig: TAKE 1 TABLET (0.35 MG) BY MOUTH DAILY       Contraceptives Protocol Failed - 5/17/2023  4:17 PM        Failed - Recent (12 mo) or future (30 days) visit within the authorizing provider's specialty     Patient has had an office visit with the authorizing provider or a provider within the authorizing providers department within the previous 12 mos or has a future within next 30 days. See \"Patient Info\" tab in inbasket, or \"Choose Columns\" in Meds & Orders section of the refill encounter.              Passed - Patient is not a current smoker if age is 35 or older        Passed - Medication is active on med list        Passed - No active pregnancy on record        Passed - No positive pregnancy test in past 12 months           Pt has not had a yearly physical with Whitley Pettit, since 10/11/21.    Pt is scheduled for a yearly physical with Whitley in June of this year.    Prescription approved per [facility] Refill Protocol.    Josselyn Poe RN          "

## 2023-08-10 ENCOUNTER — MYC MEDICAL ADVICE (OUTPATIENT)
Dept: OBGYN | Facility: CLINIC | Age: 43
End: 2023-08-10
Payer: COMMERCIAL

## 2023-08-10 DIAGNOSIS — Z30.41 ENCOUNTER FOR SURVEILLANCE OF CONTRACEPTIVE PILLS: ICD-10-CM

## 2023-08-10 RX ORDER — ACETAMINOPHEN AND CODEINE PHOSPHATE 120; 12 MG/5ML; MG/5ML
0.35 SOLUTION ORAL DAILY
Qty: 28 TABLET | Refills: 0 | Status: SHIPPED | OUTPATIENT
Start: 2023-08-10 | End: 2024-07-15

## 2023-08-10 NOTE — TELEPHONE ENCOUNTER
Pt is leaving [facility] on 8/11/23 and is in need of this before she leaves.  If ok'd please send new rx to [facility].    Thank you,  Nilsa Coronado, [facility]  [phone]

## 2023-10-26 ENCOUNTER — ANCILLARY PROCEDURE (OUTPATIENT)
Dept: MAMMOGRAPHY | Facility: CLINIC | Age: 43
End: 2023-10-26
Attending: NURSE PRACTITIONER
Payer: COMMERCIAL

## 2023-10-26 DIAGNOSIS — Z12.31 VISIT FOR SCREENING MAMMOGRAM: ICD-10-CM

## 2023-10-26 PROCEDURE — 77067 SCR MAMMO BI INCL CAD: CPT | Mod: TC | Performed by: RADIOLOGY

## 2023-10-26 PROCEDURE — 77063 BREAST TOMOSYNTHESIS BI: CPT | Mod: TC | Performed by: RADIOLOGY

## 2023-11-10 ENCOUNTER — OFFICE VISIT (OUTPATIENT)
Dept: FAMILY MEDICINE | Facility: CLINIC | Age: 43
End: 2023-11-10
Payer: COMMERCIAL

## 2023-11-10 VITALS
BODY MASS INDEX: 45.31 KG/M2 | DIASTOLIC BLOOD PRESSURE: 81 MMHG | HEART RATE: 86 BPM | WEIGHT: 240 LBS | TEMPERATURE: 98 F | OXYGEN SATURATION: 99 % | SYSTOLIC BLOOD PRESSURE: 146 MMHG | HEIGHT: 61 IN

## 2023-11-10 DIAGNOSIS — Z01.818 PREOP GENERAL PHYSICAL EXAM: Primary | ICD-10-CM

## 2023-11-10 DIAGNOSIS — Z82.49 FAMILY HISTORY OF THROMBOSIS IN FIRST DEGREE RELATIVE: ICD-10-CM

## 2023-11-10 LAB
BASOPHILS # BLD AUTO: 0 10E3/UL (ref 0–0.2)
BASOPHILS NFR BLD AUTO: 0 %
EOSINOPHIL # BLD AUTO: 0.1 10E3/UL (ref 0–0.7)
EOSINOPHIL NFR BLD AUTO: 1 %
ERYTHROCYTE [DISTWIDTH] IN BLOOD BY AUTOMATED COUNT: 13 % (ref 10–15)
FACTOR V INTERPRETATION: NORMAL
HCT VFR BLD AUTO: 43.4 % (ref 35–47)
HGB BLD-MCNC: 14.3 G/DL (ref 11.7–15.7)
IMM GRANULOCYTES # BLD: 0 10E3/UL
IMM GRANULOCYTES NFR BLD: 0 %
LAB DIRECTOR COMMENTS: NORMAL
LAB DIRECTOR DISCLAIMER: NORMAL
LAB DIRECTOR INTERPRETATION: NORMAL
LAB DIRECTOR METHODOLOGY: NORMAL
LAB DIRECTOR RESULTS: NORMAL
LYMPHOCYTES # BLD AUTO: 2.6 10E3/UL (ref 0.8–5.3)
LYMPHOCYTES NFR BLD AUTO: 26 %
MCH RBC QN AUTO: 28.5 PG (ref 26.5–33)
MCHC RBC AUTO-ENTMCNC: 32.9 G/DL (ref 31.5–36.5)
MCV RBC AUTO: 87 FL (ref 78–100)
MONOCYTES # BLD AUTO: 0.8 10E3/UL (ref 0–1.3)
MONOCYTES NFR BLD AUTO: 8 %
NEUTROPHILS # BLD AUTO: 6.5 10E3/UL (ref 1.6–8.3)
NEUTROPHILS NFR BLD AUTO: 65 %
PLATELET # BLD AUTO: 270 10E3/UL (ref 150–450)
RBC # BLD AUTO: 5.02 10E6/UL (ref 3.8–5.2)
SPECIMEN DESCRIPTION: NORMAL
WBC # BLD AUTO: 10 10E3/UL (ref 4–11)

## 2023-11-10 PROCEDURE — 80053 COMPREHEN METABOLIC PANEL: CPT | Performed by: INTERNAL MEDICINE

## 2023-11-10 PROCEDURE — 2894A FACTOR 5 LEIDEN MUTATION ANALYSIS: CPT | Performed by: PATHOLOGY

## 2023-11-10 PROCEDURE — 81241 F5 GENE: CPT | Performed by: INTERNAL MEDICINE

## 2023-11-10 PROCEDURE — 99207 PR NO CHARGE LOS: CPT | Performed by: INTERNAL MEDICINE

## 2023-11-10 PROCEDURE — 36415 COLL VENOUS BLD VENIPUNCTURE: CPT | Performed by: INTERNAL MEDICINE

## 2023-11-10 PROCEDURE — 85025 COMPLETE CBC W/AUTO DIFF WBC: CPT | Performed by: INTERNAL MEDICINE

## 2023-11-10 ASSESSMENT — PAIN SCALES - GENERAL: PAINLEVEL: SEVERE PAIN (6)

## 2023-11-10 NOTE — COMMUNITY RESOURCES LIST (ENGLISH)
11/10/2023   [facility]  N/A  For questions about this resource list or additional care needs, please contact your primary care clinic or care manager.  Phone: [phone]   Email: N/A   Address: [facility] [place] [address]   Hours: N/A        Transportation       Free or low-cost transportation  1  [employer] Distance: 8.11 miles      In-Person   [address] [place] [address]  Language: English  Hours: Mon - Fri 6:00 AM - 6:00 PM  Fees: Insurance, Self Pay   Phone: (880) 126-6593 Email: info@Gl[employer] Website: http://www.AboutOne     2  [facility] - Bus [employer] - Free or low-cost transportation Distance: 19.91 miles      In-Person   [address] [place] [address]  Language: English  Hours: Tue 9:30 AM - 11:30 AM , Thu 9:30 AM - 11:30 AM  Fees: Free   Phone: (951) 153-2691 Email: info@[employer] Website: http://www.Gu[employer] Systems/     Transportation to medical appointments  3  [employer] Distance: 8.11 miles      In-Person   [address] [place] [address]  Language: English  Hours: Mon - Fri 6:00 AM - 6:00 PM  Fees: Insurance, Self Pay   Phone: (807) 231-4013 Email: info@[employer] Website: http://www.Ad Summos.Concurrent Thinking     4  [facility]  [employer] ([employer]) Distance: 12.92 miles      In-Person   [address] [place] [address]  Language: Tajik, Romansh, English, Gujarati, Melissa, Mohawk, Portuguese, Nepali, Kiswahili, Mohawk  Hours: Mon - Wed 9:00 AM - 5:00 PM , Thu 12:00 PM - 6:00 PM , Fri 9:00 AM - 5:00 PM , Sun 10:30 AM - 2:00 PM Appt. Only  Fees: Free   Phone: (731) 433-2513 Email: info@sew-aifw.org Website: https://www.BetterDoctor.org/          Important Numbers & Websites       Emergency Services   911  [facility]   [facility]  Poison Control   (794) 986-9081  Suicide Prevention Lifeline   (397) 863-9111 (TALK)  Child Abuse Hotline   (834)  619-5419 (4-A-Child)  Sexual Assault Hotline   (820) 895-3327 (HOPE)  National Runaway Safeline   (957) 980-1773 (RUNAWAY)  All-Options Talkline   (441) 170-4570  Substance Abuse Referral   (820) 776-6486 (HELP)

## 2023-11-10 NOTE — COMMUNITY RESOURCES LIST (ENGLISH)
11/10/2023   [facility]  N/A  For questions about this resource list or additional care needs, please contact your primary care clinic or care manager.  Phone: [phone]   Email: N/A   Address: [facility] [place] [address]   Hours: N/A        Transportation       Free or low-cost transportation  1  [employer] Distance: 8.11 miles      In-Person   [address] [place] [address]  Language: English  Hours: Mon - Fri 6:00 AM - 6:00 PM  Fees: Insurance, Self Pay   Phone: (720) 541-8185 Email: info@[employer] Website: http://www.Cool Lumens     2  [facility] - Bus Pass[employer] - Free or low-cost transportation Distance: 19.91 miles      In-Person   [address] [place] [address]  Language: English  Hours: Tue 9:30 AM - 11:30 AM , Thu 9:30 AM - 11:30 AM  Fees: Free   Phone: (639) 644-1947 Email: info@[employer] [place] Website: http://www.[employer] [place]/     Transportation to medical appointments  3  [employer] Distance: 8.11 miles      In-Person   [address] [place] [address]  Language: English  Hours: Mon - Fri 6:00 AM - 6:00 PM  Fees: Insurance, Self Pay   Phone: (774) 495-2878 Email: info@[employer] Website: http://www.McPhy.Nativo     4  [facility]  [employer] (AIFW) Distance: 12.92 miles      In-Person   [address] [place] [address]  Language: Urdu, Nepali, English, Gujarati, Melissa, Belarusian, Swedish, Spanish, Syriac, Welsh  Hours: Mon - Wed 9:00 AM - 5:00 PM , Thu 12:00 PM - 6:00 PM , Fri 9:00 AM - 5:00 PM , Sun 10:30 AM - 2:00 PM Appt. Only  Fees: Free   Phone: (857) 871-3677 Email: info@sew-aifw.org Website: https://www.[employer].org/          Important Numbers & Websites       Emergency Services   911  [facility]   [facility]  Poison Control   (562) 479-9367  Suicide Prevention Lifeline   (542) 870-2834 (TALK)  Child Abuse Hotline   (491)  825-5704 (4-A-Child)  Sexual Assault Hotline   (404) 713-9699 (HOPE)  National Runaway Safeline   (139) 452-4178 (RUNAWAY)  All-Options Talkline   (697) 839-3238  Substance Abuse Referral   (531) 211-1483 (HELP)

## 2023-11-10 NOTE — PROGRESS NOTES
[address] [address]  Phone: [phone]  Primary Provider: No Ref-Primary, Physician  Pre-op Performing Provider: STEPHAN SAINZ    PREOPERATIVE EVALUATION:  Today's date: 11/10/2023    Giselle is a 43 year old female who presents for a preoperative evaluation.    Surgical Information:  Surgery/Procedure: Right Knee Arthroscopy  Surgery Location: [facility]  Surgeon: Dr. Morris  Surgery Date: 11/29/23  Time of Surgery: 9am  Where patient plans to recover: At home with family  Fax number for surgical facility: [phone]  Phone number for surgical facility: [phone]    HPI related to upcoming procedure:            This is a 43 year old female who comes in today for a preoperative evaluation. Ms. Sauer has right knee pain due to a meniscal derangement. The patient is scheduled to undergo a right knee arthroscopy on November 29, 2023.        11/10/2023    10:39 AM   Preop Questions   1. Have you ever had a heart attack or stroke? No   2. Have you ever had surgery on your heart or blood vessels, such as a stent placement, a coronary artery bypass, or surgery on an artery in your head, neck, heart, or legs? No   3. Do you have chest pain with activity? No   4. Do you have a history of  heart failure? No   5. Do you currently have a cold, bronchitis or symptoms of other infection? No   6. Do you have a cough, shortness of breath, or wheezing? YES    7. Do you or anyone in your family have previous history of blood clots? YES    8. Do you or does anyone in your family have a serious bleeding problem such as prolonged bleeding following surgeries or cuts? YES    9. Have you ever had problems with anemia or been told to take iron pills? No   10. Have you had any abnormal blood loss such as black, tarry or bloody stools, or abnormal vaginal bleeding? No   11. Have you ever had a blood transfusion? No   12. Are you  willing to have a blood transfusion if it is medically needed before, during, or after your surgery? Yes   13. Have you or any of your relatives ever had problems with anesthesia? YES    14. Do you have sleep apnea, excessive snoring or daytime drowsiness? YES    14a. Do you have a CPAP machine? No   15. Do you have any artifical heart valves or other implanted medical devices like a pacemaker, defibrillator, or continuous glucose monitor? No   16. Do you have artificial joints? No   17. Are you allergic to latex? No   18. Is there any chance that you may be pregnant? No       Health Care Directive:  Patient does not have a Health Care Directive or Living Will: Patient wants FULL CODE.    Preoperative Review of :   reviewed - no record of controlled substances prescribed.      Review of Systems  CONSTITUTIONAL: NEGATIVE for fever, chills, change in weight  INTEGUMENTARY/SKIN: NEGATIVE for worrisome rashes, moles or lesions  EYES: NEGATIVE for vision changes or irritation  ENT/MOUTH: NEGATIVE for ear, mouth and throat problems  RESP: NEGATIVE for significant cough or SOB  CV: NEGATIVE for chest pain, palpitations or peripheral edema  GI: NEGATIVE for nausea, abdominal pain, heartburn, or change in bowel habits  : NEGATIVE for frequency, dysuria, or hematuria  MUSCULOSKELETAL: NEGATIVE for significant arthralgias or myalgia  NEURO: NEGATIVE for weakness, dizziness or paresthesias  ENDOCRINE: NEGATIVE for temperature intolerance, skin/hair changes  HEME: NEGATIVE for bleeding problems  PSYCHIATRIC: NEGATIVE for changes in mood or affect    Patient Active Problem List    Diagnosis Date Noted    Malignant melanoma (H)      Priority: Medium    Obesity 07/18/2011     Priority: Medium    CARDIOVASCULAR SCREENING; LDL GOAL LESS THAN 160 10/31/2010     Priority: Medium      Past Medical History:   Diagnosis Date    Malignant melanoma (H)     Migraine without aura      Past Surgical History:   Procedure Laterality Date  "   TONSILLECTOMY      [facility] STABISM SURG,PREV EYE SURG,NOT MUSC       Current Outpatient Medications   Medication Sig Dispense Refill    ASPIRIN 81 PO Take by mouth daily       norethindrone (MICRONOR) 0.35 MG tablet Take 1 tablet (0.35 mg) by mouth daily Needs to be seen for further refills. 28 tablet 0    EMILE FE 1/20 1-20 MG-MCG tablet TAKE 1 TABLET BY MOUTH DAILY (Patient not taking: Reported on 11/10/2023) 84 tablet 0       Allergies   Allergen Reactions    Sulfa Antibiotics Hives and Swelling     Swelling-throat        Social History     Tobacco Use    Smoking status: Never    Smokeless tobacco: Never   Substance Use Topics    Alcohol use: No       Objective   BP (!) 146/81 (BP Location: Left arm, Patient Position: Sitting, Cuff Size: Adult Large)   Pulse 86   Temp 98  F (36.7  C) (Tympanic)   Ht 1.549 m (5' 1\")   Wt 108.9 kg (240 lb)   LMP 10/25/2023 (Approximate)   SpO2 99%   BMI 45.35 kg/m    Physical Exam    GENERAL APPEARANCE: healthy, alert and no distress     EYES: Eyes grossly normal to inspection and conjunctivae and sclerae normal     HENT: normal cephalic/atraumatic     RESP: lungs clear to auscultation - no rales, rhonchi or wheezes     CV: regular rates and rhythm     NEURO: mentation intact and speech normal     PSYCH: mentation appears normal. and affect normal/bright     PSYCH: mentation appears normal       Diagnostics:  No EKG required, no history of coronary heart disease, significant arrhythmia, peripheral arterial disease or other structural heart disease.    Component  Ref Range & Units 13 d ago     WBC Count  4.0 - 11.0 10e3/uL 10.0    RBC Count  3.80 - 5.20 10e6/uL 5.02    Hemoglobin  11.7 - 15.7 g/dL 14.3    Hematocrit  35.0 - 47.0 % 43.4    MCV  78 - 100 fL 87    MCH  26.5 - 33.0 pg 28.5    MCHC  31.5 - 36.5 g/dL 32.9    RDW  10.0 - 15.0 % 13.0    Platelet Count  150 - 450 10e3/uL 270    % Neutrophils  % 65    % Lymphocytes  % 26    % Monocytes  % 8    % Eosinophils  % 1    % " Basophils  % 0    % Immature Granulocytes  % 0    Absolute Neutrophils  1.6 - 8.3 10e3/uL 6.5    Absolute Lymphocytes  0.8 - 5.3 10e3/uL 2.6    Absolute Monocytes  0.0 - 1.3 10e3/uL 0.8    Absolute Eosinophils  0.0 - 0.7 10e3/uL 0.1    Absolute Basophils  0.0 - 0.2 10e3/uL 0.0    Absolute Immature Granulocytes  <=0.4 10e3/uL 0.0   [facility]              Specimen Collected: 11/10/23 12:00 PM Last Resulted: 11/10/23 12:03 PM           omponent  Ref Range & Units 13 d ago 12 yr ago     Sodium  135 - 145 mmol/L 139 139 R   Comment: Reference intervals for this test were updated on 09/26/2023 to more accurately reflect our healthy population. There may be differences in the flagging of prior results with similar values performed with this method. Interpretation of those prior results can be made in the context of the updated reference intervals.    Potassium  3.4 - 5.3 mmol/L 4.3     Carbon Dioxide (CO2)  22 - 29 mmol/L 25     Anion Gap  7 - 15 mmol/L 11 8 R    Urea Nitrogen  6.0 - 20.0 mg/dL 9.0     Creatinine  0.51 - 0.95 mg/dL 0.77 0.71 R    GFR Estimate  >60 mL/min/1.73m2 >90 >90 R    Calcium  8.6 - 10.0 mg/dL 9.1 8.6 R    Chloride  98 - 107 mmol/L 103     Glucose  70 - 99 mg/dL 95     Alkaline Phosphatase  35 - 104 U/L 59     AST  0 - 45 U/L 17    Comment: Reference intervals for this test were updated on 6/12/2023 to more accurately reflect our healthy population. There may be differences in the flagging of prior results with similar values performed with this method. Interpretation of those prior results can be made in the context of the updated reference intervals.    ALT  0 - 50 U/L 15    Comment: Reference intervals for this test were updated on 6/12/2023 to more accurately reflect our healthy population. There may be differences in the flagging of prior results with similar values performed with this method. Interpretation of those prior results can be made in the context of the updated  reference intervals.      Protein Total  6.4 - 8.3 g/dL 7.0     Albumin  3.5 - 5.2 g/dL 4.2     Bilirubin Total  <=1.2 mg/dL 0.2    Resulting Agency [facility]              Specimen Collected: 11/10/23 12:00 PM Last Resulted: 11/11/23  4:10 AM           RESULTS       Factor V 1691G>A (Leiden)  RESULTS:  Mutation analyzed: 1691G>A  Factor V 1691G>A (Leiden)  Interpretation:  ABSENT  Factor V 1691G>A (Leiden) mutation  genotype:  G/G      INTERPRETATION    The patient is negative for the F5 gene mutation [id number] (1691G>A) mutation.  (Electronically signed by: Kris Heredia MD     CG Urine Qualitative  Negative Negative    Comment: This test is for screening purposes.  Results should be interpreted along with the clinical picture.  Confirmation testing is available if warranted by ordering MIN125, HCG Quantitative Pregnancy.   Resulting Agency [facility]              Specimen Collected: 11/22/23  4:16 PM Last Resulted: 11/22/23  4:31           ASSESSMENT/PLAN  Preop general physical exam  Revised Cardiac Risk Index (RCRI):  The patient has the following serious cardiovascular risks for perioperative complications:   - No serious cardiac risks = 0 points   RCRI Interpretation: 0 points: Class I (very low risk - 0.4% complication rate)  - REVIEW OF HEALTH MAINTENANCE PROTOCOL ORDERS.  Patient is cleared for surgery.   - CBC with platelets and differential  - Comprehensive metabolic panel    Family history of thrombosis in first degree relative  - Factor 5 leiden mutation analysis     Disposition:  Follow-up in 4 weeks or as needed.    Signed Electronically by: Gregor Benavides MD  Copy of this evaluation report is provided to requesting physician.

## 2023-11-10 NOTE — NURSING NOTE
Patient Quality Outreach    Patient is due for the following:   Physical Preventive Adult Physical      Topic Date Due    Flu Vaccine (1) 09/01/2023    COVID-19 Vaccine (3 - 2023-24 season) 09/01/2023       Next Steps:   Patient declined follow up at this time.    Type of outreach:    Spoke with patient at appointment      Questions for provider review:    None           Elana Chavira MA

## 2023-11-10 NOTE — PATIENT INSTRUCTIONS
Preparing for Your Surgery  Getting started  A nurse will call you to review your health history and instructions. They will give you an arrival time based on your scheduled surgery time. Please be ready to share:  Your doctor's clinic name and phone number  Your medical, surgical, and anesthesia history  A list of allergies and sensitivities  A list of medicines, including herbal treatments and over-the-counter drugs  Whether the patient has a legal guardian (ask how to send us the papers in advance)  Please tell us if you're pregnant--or if there's any chance you might be pregnant. Some surgeries may injure a fetus (unborn baby), so they require a pregnancy test. Surgeries that are safe for a fetus don't always need a test, and you can choose whether to have one.   If you have a child who's having surgery, please ask for a copy of Preparing for Your Child's Surgery.    Preparing for surgery  Within 10 to 30 days of surgery: Have a pre-op exam (sometimes called an H&P, or History and Physical). This can be done at a clinic or pre-operative center.  If you're having a , you may not need this exam. Talk to your care team.  At your pre-op exam, talk to your care team about all medicines you take. If you need to stop any medicines before surgery, ask when to start taking them again.  We do this for your safety. Many medicines can make you bleed too much during surgery. Some change how well surgery (anesthesia) drugs work.  Call your insurance company to let them know you're having surgery. (If you don't have insurance, call [phone].)  Call your clinic if there's any change in your health. This includes signs of a cold or flu (sore throat, runny nose, cough, rash, fever). It also includes a scrape or scratch near the surgery site.  If you have questions on the day of surgery, call your hospital or surgery center.  Eating and drinking guidelines  For your safety: Unless your surgeon tells you otherwise,  follow the guidelines below.  Eat and drink as usual until 8 hours before you arrive for surgery. After that, no food or milk.  Drink clear liquids until 2 hours before you arrive. These are liquids you can see through, like water, Gatorade, and Propel Water. They also include plain black coffee and tea (no cream or milk), candy, and breath mints. You can spit out gum when you arrive.  If you drink alcohol: Stop drinking it the night before surgery.  If your care team tells you to take medicine on the morning of surgery, it's okay to take it with a sip of water.  Preventing infection  Shower or bathe the night before and morning of your surgery. Follow the instructions your clinic gave you. (If no instructions, use regular soap.)  Don't shave or clip hair near your surgery site. We'll remove the hair if needed.  Don't smoke or vape the morning of surgery. You may chew nicotine gum up to 2 hours before surgery. A nicotine patch is okay.  Note: Some surgeries require you to completely quit smoking and nicotine. Check with your surgeon.  Your care team will make every effort to keep you safe from infection. We will:  Clean our hands often with soap and water (or an alcohol-based hand rub).  Clean the skin at your surgery site with a special soap that kills germs.  Give you a special gown to keep you warm. (Cold raises the risk of infection.)  Wear special hair covers, masks, gowns and gloves during surgery.  Give antibiotic medicine, if prescribed. Not all surgeries need antibiotics.  What to bring on the day of surgery  Photo ID and insurance card  Copy of your health care directive, if you have one  Glasses and hearing aids (bring cases)  You can't wear contacts during surgery  Inhaler and eye drops, if you use them (tell us about these when you arrive)  CPAP machine or breathing device, if you use them  A few personal items, if spending the night  If you have . . .  A pacemaker, ICD (cardiac defibrillator) or other  implant: Bring the ID card.  An implanted stimulator: Bring the remote control.  A legal guardian: Bring a copy of the certified (court-stamped) guardianship papers.  Please remove any jewelry, including body piercings. Leave jewelry and other valuables at home.  If you're going home the day of surgery  You must have a responsible adult drive you home. They should stay with you overnight as well.  If you don't have someone to stay with you, and you aren't safe to go home alone, we may keep you overnight. Insurance often won't pay for this.  After surgery  If it's hard to control your pain or you need more pain medicine, please call your surgeon's office.  Questions?   If you have any questions for your care team, list them here: _________________________________________________________________________________________________________________________________________________________________________ ____________________________________ ____________________________________ ____________________________________  For informational purposes only. Not to replace the advice of your health care provider. Copyright   2003, 2019 [facility]. All rights reserved. Clinically reviewed by Deana Morataya MD. SMARTworks 647201 - REV 12/22.

## 2023-11-11 LAB
ALBUMIN SERPL BCG-MCNC: 4.2 G/DL (ref 3.5–5.2)
ALP SERPL-CCNC: 59 U/L (ref 35–104)
ALT SERPL W P-5'-P-CCNC: 15 U/L (ref 0–50)
ANION GAP SERPL CALCULATED.3IONS-SCNC: 11 MMOL/L (ref 7–15)
AST SERPL W P-5'-P-CCNC: 17 U/L (ref 0–45)
BILIRUB SERPL-MCNC: 0.2 MG/DL
BUN SERPL-MCNC: 9 MG/DL (ref 6–20)
CALCIUM SERPL-MCNC: 9.1 MG/DL (ref 8.6–10)
CHLORIDE SERPL-SCNC: 103 MMOL/L (ref 98–107)
CREAT SERPL-MCNC: 0.77 MG/DL (ref 0.51–0.95)
DEPRECATED HCO3 PLAS-SCNC: 25 MMOL/L (ref 22–29)
EGFRCR SERPLBLD CKD-EPI 2021: >90 ML/MIN/1.73M2
GLUCOSE SERPL-MCNC: 95 MG/DL (ref 70–99)
POTASSIUM SERPL-SCNC: 4.3 MMOL/L (ref 3.4–5.3)
PROT SERPL-MCNC: 7 G/DL (ref 6.4–8.3)
SODIUM SERPL-SCNC: 139 MMOL/L (ref 135–145)

## 2023-11-17 ENCOUNTER — TELEPHONE (OUTPATIENT)
Dept: OBGYN | Facility: CLINIC | Age: 43
End: 2023-11-17
Payer: COMMERCIAL

## 2023-11-17 DIAGNOSIS — Z32.00 POSSIBLE PREGNANCY, NOT CONFIRMED: Primary | ICD-10-CM

## 2023-11-17 NOTE — TELEPHONE ENCOUNTER
[facility] Call Center    Phone Message    May a detailed message be left on voicemail: yes     Reason for Call: Order(s): Other:   Reason for requested: Pt states pregnancy test is required prior to procedure.       Action Taken: Other: OBGYN    Travel Screening: Not Applicable

## 2023-11-17 NOTE — TELEPHONE ENCOUNTER
Called patient back for more information re: request for pregnancy testing.     Patient states she is going to have knee surgery at [facility] on 11/29/2023.  Has already had preop with Dr. Benavides on 11/10/2023.    Pt was just looking over pre surgery instruction which she just realized requires a negative pregnancy test.  Patient is asking if provider can order.    Routed request to Dr. Benavides.

## 2023-11-17 NOTE — TELEPHONE ENCOUNTER
Pt needs order for a pregnancy test to be done for upcoming surgery.  Please call pt once order has been placed.

## 2023-11-22 ENCOUNTER — LAB (OUTPATIENT)
Dept: LAB | Facility: CLINIC | Age: 43
End: 2023-11-22
Payer: COMMERCIAL

## 2023-11-22 DIAGNOSIS — Z11.59 NEED FOR HEPATITIS C SCREENING TEST: Primary | ICD-10-CM

## 2023-11-22 DIAGNOSIS — Z32.00 POSSIBLE PREGNANCY, NOT CONFIRMED: ICD-10-CM

## 2023-11-22 LAB — HCG UR QL: NEGATIVE

## 2023-11-22 PROCEDURE — 86803 HEPATITIS C AB TEST: CPT

## 2023-11-22 PROCEDURE — 36415 COLL VENOUS BLD VENIPUNCTURE: CPT

## 2023-11-22 PROCEDURE — 81025 URINE PREGNANCY TEST: CPT

## 2023-11-24 LAB — HCV AB SERPL QL IA: NONREACTIVE

## 2024-05-08 ENCOUNTER — VIRTUAL VISIT (OUTPATIENT)
Dept: FAMILY MEDICINE | Facility: OTHER | Age: 44
End: 2024-05-08
Payer: COMMERCIAL

## 2024-05-08 DIAGNOSIS — E66.01 CLASS 3 SEVERE OBESITY DUE TO EXCESS CALORIES WITHOUT SERIOUS COMORBIDITY WITH BODY MASS INDEX (BMI) OF 45.0 TO 49.9 IN ADULT (H): Primary | ICD-10-CM

## 2024-05-08 DIAGNOSIS — E66.813 CLASS 3 SEVERE OBESITY DUE TO EXCESS CALORIES WITHOUT SERIOUS COMORBIDITY WITH BODY MASS INDEX (BMI) OF 45.0 TO 49.9 IN ADULT (H): Primary | ICD-10-CM

## 2024-05-08 PROCEDURE — 99203 OFFICE O/P NEW LOW 30 MIN: CPT | Mod: 95 | Performed by: PHYSICIAN ASSISTANT

## 2024-05-08 RX ORDER — PHENTERMINE HYDROCHLORIDE 15 MG/1
15 CAPSULE ORAL EVERY MORNING
Qty: 30 CAPSULE | Refills: 0 | Status: SHIPPED | OUTPATIENT
Start: 2024-05-08 | End: 2024-06-05

## 2024-05-08 NOTE — PATIENT INSTRUCTIONS
Medications   Phentermine - only 12 weeks, can cause irregular heartbeats, anxiety, sweating, insomnia   Topamax - anti-seizure, 1 or 2 times a day, same side effects possible as Phentermine, but usually less severe   Qsymia - combination of Phentermine and Topamax   Wellbutrin - antidepressant, helps feel full   Naltrexone - withdrawal medication, helps with metabolism  Contrave - combination of Wellbutrin and Naltrexone   GLP-1 injections - Wegovy (nation wide shortage of this), Zepbound, and Saxenda         Nausea, upset, stomach, vomiting common after injection         Improves with time         Long term - constipation     All require 1 month check ins as doses are increased

## 2024-05-08 NOTE — PROGRESS NOTES
"    Instructions Relayed to Patient by Virtual Roomer:     Patient is active on mSpoke:   Relayed following to patient: \"It looks like you are active on mSpoke, are you able to join the visit this way? If not, do you need us to send you a link now or would you like your provider to send a link via text or email when they are ready to initiate the visit?\"      Patient Confirmed they will join visit via: [employer]  Reminded patient to ensure they were logged on to virtual visit by arrival time listed.   Asked if patient has flexibility to initiate visit sooner than arrival time: patient is unable to initiate visit earlier than arrival time     If pediatric virtual visit, ensured pediatric patient along with parent/guardian will be present for video visit.     Patient offered the website [email or web address] and/or phone number to mSpoke Help line: [phone]    Giselle is a 44 year old who is being evaluated via a billable video visit.    How would you like to obtain your AVS? [employer]  If the video visit is dropped, the invitation should be resent by: Text to cell phone: [phone]  Will anyone else be joining your video visit? No      Assessment & Plan     ICD-10-CM    1. Class 3 severe obesity due to excess calories without serious comorbidity with body mass index (BMI) of 45.0 to 49.9 in adult (H)  E66.01 Adult Comprehensive Weight Management  Referral    Z68.42 phentermine 15 MG capsule        - Discussed all medications at length   - Patient will check with insurance for coverage   - Will start Phentermine 15 mg      Reviewed use and side effects  - Recheck 1 month   - Has a lot of nutrition and lifestyle questions, I think she would also benefit from the weight management center   - Referral placed   - Patients questions answered to the best of my ability including diet goals      Review of the result(s) of each unique test - See list         11/10/23 - CMP   Diagnosis or treatment " significantly limited by social determinants of health - None     30 minutes spent on the date of the encounter doing chart review, history and exam, documentation and further activities as noted above    The patient indicates understanding of these issues and agrees with the plan.    Follow up: 1 month     Student participated in   PRINCE DawnS   [facility]     Mickey Mane PA-C  [facility] [place] Desai is a 44 year old, presenting for the following health issues:  Weight Loss      5/8/2024    12:14 PM   Additional Questions   Roomed by ameya   Accompanied by self     History of Present Illness       Reason for visit:  Help with weight loss options due to excessive weight gain and being unable to lose it with healthier eating options and daily excercise.    She eats 4 or more servings of fruits and vegetables daily.She consumes 1 sweetened beverage(s) daily.She exercises with enough effort to increase her heart rate 30 to 60 minutes per day.  She exercises with enough effort to increase her heart rate 5 days per week.   She is taking medications regularly.     - Had knee surgery in August, started with eating   - Since November (after surgery) more activity   - Diet in moderation   - Elliptical for exercise, 3 walks a day      Still some knee pain, can't do miles yet   - Feels defeated     - Sister Wellbutrin - made angry      Other sister on shots       Review of Systems  Constitutional, HEENT, cardiovascular, pulmonary, gi and gu systems are negative, except as otherwise noted.      Objective       Vitals:  No vitals were obtained today due to virtual visit.    Physical Exam   GENERAL: alert and no distress  EYES: Eyes grossly normal to inspection.  No discharge or erythema, or obvious scleral/conjunctival abnormalities.  RESP: No audible wheeze, cough, or visible cyanosis.    SKIN: Visible skin clear. No significant rash, abnormal pigmentation or  lesions.  NEURO: Cranial nerves grossly intact.  Mentation and speech appropriate for age.  PSYCH: Appropriate affect, tone, and pace of words    Diagnostics: none       Video-Visit Details    Type of service:  Video Visit   Originating Location (pt. Location): Home  Distant Location (provider location):  Off-site  Platform used for Video Visit: Gautam  Signed Electronically by: Michelle Mane PA-C     normal...

## 2024-06-05 ENCOUNTER — VIRTUAL VISIT (OUTPATIENT)
Dept: FAMILY MEDICINE | Facility: OTHER | Age: 44
End: 2024-06-05
Payer: COMMERCIAL

## 2024-06-05 DIAGNOSIS — E66.01 CLASS 3 SEVERE OBESITY DUE TO EXCESS CALORIES WITHOUT SERIOUS COMORBIDITY WITH BODY MASS INDEX (BMI) OF 45.0 TO 49.9 IN ADULT (H): ICD-10-CM

## 2024-06-05 DIAGNOSIS — E66.813 CLASS 3 SEVERE OBESITY DUE TO EXCESS CALORIES WITHOUT SERIOUS COMORBIDITY WITH BODY MASS INDEX (BMI) OF 45.0 TO 49.9 IN ADULT (H): ICD-10-CM

## 2024-06-05 PROCEDURE — 99213 OFFICE O/P EST LOW 20 MIN: CPT | Mod: 95 | Performed by: PHYSICIAN ASSISTANT

## 2024-06-05 RX ORDER — PHENTERMINE HYDROCHLORIDE 30 MG/1
30 CAPSULE ORAL EVERY MORNING
Qty: 30 CAPSULE | Refills: 1 | Status: SHIPPED | OUTPATIENT
Start: 2024-06-05 | End: 2024-07-31 | Stop reason: DRUGHIGH

## 2024-06-05 NOTE — PROGRESS NOTES
Giselle is a 44 year old who is being evaluated via a billable video visit.    How would you like to obtain your AVS? MyChart  If the video visit is dropped, the invitation should be resent by: Text to cell phone: [phone]  Will anyone else be joining your video visit? No      Assessment & Plan     ICD-10-CM    1. Class 3 severe obesity due to excess calories without serious comorbidity with body mass index (BMI) of 45.0 to 49.9 in adult (H)  E66.01 phentermine 30 MG capsule    Z68.42         - Previously, discussed all medications at length, elected to start Phentermine   - Reports this has gone well with minimal side effects   - Discussed increase to 30 mg vs. Staying at 15 mg   - Elects to increase to 30 mg   -  reviewed, no concerns   - Reviewed use and side effects and can only be on high dose for total of 12 weeks  - Recheck 4-6 weeks       Review of the result(s) of each unique test - None     Diagnosis or treatment significantly limited by social determinants of health - None     15 minutes spent on the date of the encounter doing chart review, history and exam, documentation and further activities as noted above    The patient indicates understanding of these issues and agrees with the plan.    Follow up: 4-6 weeks     Mickey Washington[facility]CHUCHO Zamorano  [facility]   Giselle is a 44 year old, presenting for the following health issues:  Weight Loss (/)      6/5/2024    10:59 AM   Additional Questions   Roomed by ameya   Accompanied by self     History of Present Illness       Reason for visit:  Weight loss medication review    She eats 4 or more servings of fruits and vegetables daily.She consumes 1 sweetened beverage(s) daily.She exercises with enough effort to increase her heart rate 30 to 60 minutes per day.  She exercises with enough effort to increase her heart rate 5 days per week.   She is taking medications regularly.       Medication Followup of  phentermine  Taking Medication as prescribed: yes  Side Effects:  None  Medication Helping Symptoms:  yes    - Went well   - Sleep was off for a a little bit but improved   - Doesn't weigh self but feels like clothes fitting different   - Little more energy         Review of Systems  Constitutional, HEENT, cardiovascular, pulmonary, gi and gu systems are negative, except as otherwise noted.      Objective       Vitals:  No vitals were obtained today due to virtual visit.    Physical Exam   GENERAL: alert and no distress  EYES: Eyes grossly normal to inspection.  No discharge or erythema, or obvious scleral/conjunctival abnormalities.  RESP: No audible wheeze, cough, or visible cyanosis.    SKIN: Visible skin clear. No significant rash, abnormal pigmentation or lesions.  NEURO: Cranial nerves grossly intact.  Mentation and speech appropriate for age.  PSYCH: Appropriate affect, tone, and pace of words    Diagnostics: none       Video-Visit Details    Type of service:  Video Visit   Originating Location (pt. Location): Home  Distant Location (provider location):  Off-site  Platform used for Video Visit: Gautam  Signed Electronically by: Michelle Mane PA-C

## 2024-06-12 ENCOUNTER — TELEPHONE (OUTPATIENT)
Dept: FAMILY MEDICINE | Facility: OTHER | Age: 44
End: 2024-06-12
Payer: COMMERCIAL

## 2024-06-12 NOTE — TELEPHONE ENCOUNTER
Per provider, patient's appointment needs to be rescheduled due to provider out of clinic.  Please reschedule patient in the next available Virtual slot.    Cancelled appointment with Michelle Mane PA-C on 7/3/2024 at 10:55am.     Patient was called and message left informing patient/parent of the need to be rescheduled.  Appointment was cancelled and encounter created for message trail.    [employer] was also sent to inform patient of this. Marivel Gil

## 2024-06-29 ENCOUNTER — HEALTH MAINTENANCE LETTER (OUTPATIENT)
Age: 44
End: 2024-06-29

## 2024-07-08 ENCOUNTER — VIRTUAL VISIT (OUTPATIENT)
Dept: FAMILY MEDICINE | Facility: OTHER | Age: 44
End: 2024-07-08
Payer: COMMERCIAL

## 2024-07-08 DIAGNOSIS — E66.813 CLASS 3 SEVERE OBESITY DUE TO EXCESS CALORIES WITHOUT SERIOUS COMORBIDITY WITH BODY MASS INDEX (BMI) OF 45.0 TO 49.9 IN ADULT (H): Primary | ICD-10-CM

## 2024-07-08 DIAGNOSIS — E66.01 CLASS 3 SEVERE OBESITY DUE TO EXCESS CALORIES WITHOUT SERIOUS COMORBIDITY WITH BODY MASS INDEX (BMI) OF 45.0 TO 49.9 IN ADULT (H): Primary | ICD-10-CM

## 2024-07-08 PROCEDURE — 99441 PR PHYSICIAN TELEPHONE EVALUATION 5-10 MIN: CPT | Performed by: PHYSICIAN ASSISTANT

## 2024-07-08 RX ORDER — TOPIRAMATE 25 MG/1
25 TABLET, FILM COATED ORAL DAILY
Qty: 30 TABLET | Refills: 2 | Status: SHIPPED | OUTPATIENT
Start: 2024-07-08 | End: 2024-07-30

## 2024-07-08 RX ORDER — PHENTERMINE HYDROCHLORIDE 37.5 MG/1
37.5 TABLET ORAL
Qty: 30 TABLET | Refills: 2 | Status: SHIPPED | OUTPATIENT
Start: 2024-07-08 | End: 2024-09-25

## 2024-07-08 NOTE — PROGRESS NOTES
Giselle is a 44 year old who is being evaluated via a billable video visit.    How would you like to obtain your AVS? MyChart  If the video visit is dropped, the invitation should be resent by: Text to cell phone: [phone]  Will anyone else be joining your video visit? No    Assessment & Plan     ICD-10-CM    1. Class 3 severe obesity due to excess calories without serious comorbidity with body mass index (BMI) of 45.0 to 49.9 in adult (H)  E66.01 phentermine (ADIPEX-P) 37.5 MG tablet    Z68.42 topiramate (TOPAMAX) 25 MG tablet          - Previously, discussed all medications at length, elected to start Phentermine   - Reports this has gone well with minimal side effects   - Last visit increased to Phentermine 30      She reports still no side effects but only 2 lbs weight loss      About 12 in total since started working on this   - Elects to increase to 37.5 mg for the next month      Recommend also starting Topamax 25 mg (for equivalent of product Qysmia)   -  reviewed, no concerns   - Reviewed use and side effects and can only be on high dose for total of 12 weeks  - Recheck 4-6 weeks        Review of the result(s) of each unique test - None    Diagnosis or treatment significantly limited by social determinants of health - None     17 minutes spent on the date of the encounter doing chart review, history and exam, documentation and further activities as noted above    The patient indicates understanding of these issues and agrees with the plan.    Follow up: 1 month     Mickey Washington[facility]CHUCHO Zamorano  [facility]   Giselle is a 44 year old, presenting for the following health issues:  Weight Loss (/)        7/8/2024    12:39 PM   Additional Questions   Roomed by ameya   Accompanied by self     History of Present Illness       Reason for visit:  Follow up on how medication is working and refill request.    She eats 4 or more servings of fruits and vegetables daily.She  "consumes 1 sweetened beverage(s) daily.She exercises with enough effort to increase her heart rate 30 to 60 minutes per day.  She exercises with enough effort to increase her heart rate 5 days per week.   She is taking medications regularly.       Medication Followup of phentermine  Taking Medication as prescribed: yes  Side Effects:  None  Medication Helping Symptoms:  yes    - Frustrated   - Weight after apt - 230.2   - Weight today 228.4   - Probably 237 lbs in April      Maybe around 12 lbs   - Cutting calories   - >10,000 steps   - No side effects, some from the beginning are now gone     - Insurance will not cover weight management center       Review of Systems  Constitutional, neuro, ENT, endocrine, pulmonary, cardiac, gastrointestinal, genitourinary, musculoskeletal, integument and psychiatric systems are negative, except as otherwise noted.      Objective    Vitals - Patient Reported  Weight (Patient Reported): 99.8 kg (220 lb)  Height (Patient Reported): 154.9 cm (5' 1\")  BMI (Based on Pt Reported Ht/Wt): 41.57  Vitals:  No vitals were obtained today due to virtual visit.    Physical Exam   GENERAL: alert and no distress  EYES: Eyes grossly normal to inspection.  No discharge or erythema, or obvious scleral/conjunctival abnormalities.  RESP: No audible wheeze, cough, or visible cyanosis.    SKIN: Visible skin clear. No significant rash, abnormal pigmentation or lesions.  NEURO: Cranial nerves grossly intact.  Mentation and speech appropriate for age.  PSYCH: Appropriate affect, tone, and pace of words    Diagnostics: none         Switched to telephone - Duration: 9 minutes   \  "

## 2024-07-15 DIAGNOSIS — Z30.41 ENCOUNTER FOR SURVEILLANCE OF CONTRACEPTIVE PILLS: ICD-10-CM

## 2024-07-15 NOTE — TELEPHONE ENCOUNTER
Requested Prescriptions   Pending Prescriptions Disp Refills    norethindrone (MICRONOR) 0.35 MG tablet 28 tablet 0     Sig: Take 1 tablet (0.35 mg) by mouth daily Needs to be seen for further refills.       Contraceptives Protocol Failed - 7/15/2024  1:33 PM        Failed - Recent (12 mo) or future (30 days) visit within the authorizing provider's specialty     The patient must have completed an in-person or virtual visit within the past 12 months or has a future visit scheduled within the next 90 days with the authorizing provider s specialty.  Urgent care and e-visits do not quality as an office visit for this protocol.          Failed - Medication indicated for associated diagnosis     Medication is associated with one or more of the following diagnoses:  Contraception  Acne  Dysmenorrhea  Menorrhagia  Amenorrhea  PCOS  Premenstrual Dysphoric Disorder          Passed - Patient is not a current smoker if age is 35 or older        Passed - Medication is active on med list        Passed - No active pregnancy on record        Passed - No positive pregnancy test in past 12 months           Pt hasn't been seen by KARYNA Jimenez CNP, on 10/11/21.  She was scheduled with Whitley on 1/27/23 but she no showed.    Sending pt a [employer] message.  She was advised to be seen back in August, 2023, ,when she requested a refill and she did schedule for 9/2023 but appt had been canceled.  It appears that pt had made and canceled several appts with Whitley in 2023.    Sending pt a [employer] message advising that she will need to be seen for further refills as she has had several tawana refills and hasn't been seen since 2021.    Josselyn Poe RN

## 2024-07-15 NOTE — TELEPHONE ENCOUNTER
[facility] Call Center    Phone Message    May a detailed message be left on voicemail: yes     Reason for Call: Medication Refill Request    Has the patient contacted the pharmacy for the refill? Yes   Name of medication being requested: norethindrone (MICRONOR) 0.35 MG   Provider who prescribed the medication: Wilver  Pharmacy:   [id number] - [place], [place] - [address] [facility]     Date medication is needed: ASAP     Action Taken: Message routed to:  Other: AN OBGYN    Travel Screening: Not Applicable

## 2024-07-17 NOTE — TELEPHONE ENCOUNTER
Mychart message has not been read.  Attempted to call pt to see if she needs refills and offer to schedule an appointment.   LM on     Pt hasn't been seen by KARYNA Jimenez CNP, on 10/11/21.  She was scheduled with Whitley on 1/27/23 but she no showed.     Sending pt a [employer] message.  She was advised to be seen back in August, 2023, ,when she requested a refill and she did schedule for 9/2023 but appt had been canceled.  It appears that pt had made and canceled several appts with Whitley in 2023.    Clarisa POCNE RN

## 2024-07-18 NOTE — TELEPHONE ENCOUNTER
Routing refill request to provider for review/approval because:  Kristine given x1 and patient did not follow up, please advise    Unable to reach pt via mychart or by phone to help her schedule.    Pt hasn't been seen by KARYNA Jimenez CNP, since 10/11/21.  She was last scheduled with Whitley on 1/27/23 but she no showed.  She was advised to be seen back in August, 2023, when she requested a refill and she did schedule for 9/2023 but appt had been canceled.  It appears that pt had made and canceled several appts with Whitley in 2023.    Josselyn Poe, GRETTA

## 2024-07-19 RX ORDER — ACETAMINOPHEN AND CODEINE PHOSPHATE 120; 12 MG/5ML; MG/5ML
0.35 SOLUTION ORAL DAILY
Qty: 28 TABLET | Refills: 0 | Status: SHIPPED | OUTPATIENT
Start: 2024-07-19 | End: 2024-09-12

## 2024-07-30 DIAGNOSIS — E66.813 CLASS 3 SEVERE OBESITY DUE TO EXCESS CALORIES WITHOUT SERIOUS COMORBIDITY WITH BODY MASS INDEX (BMI) OF 45.0 TO 49.9 IN ADULT (H): ICD-10-CM

## 2024-07-30 DIAGNOSIS — E66.01 CLASS 3 SEVERE OBESITY DUE TO EXCESS CALORIES WITHOUT SERIOUS COMORBIDITY WITH BODY MASS INDEX (BMI) OF 45.0 TO 49.9 IN ADULT (H): ICD-10-CM

## 2024-07-30 RX ORDER — TOPIRAMATE 25 MG/1
25 TABLET, FILM COATED ORAL DAILY
Qty: 30 TABLET | Refills: 0 | Status: SHIPPED | OUTPATIENT
Start: 2024-07-30 | End: 2024-07-31

## 2024-07-30 NOTE — TELEPHONE ENCOUNTER
Has appointment tomorrow    Mickey Washington-CHUCHO Zamorano  [facility] [place]

## 2024-07-31 ENCOUNTER — VIRTUAL VISIT (OUTPATIENT)
Dept: FAMILY MEDICINE | Facility: OTHER | Age: 44
End: 2024-07-31
Payer: COMMERCIAL

## 2024-07-31 DIAGNOSIS — E66.01 CLASS 3 SEVERE OBESITY DUE TO EXCESS CALORIES WITHOUT SERIOUS COMORBIDITY WITH BODY MASS INDEX (BMI) OF 45.0 TO 49.9 IN ADULT (H): ICD-10-CM

## 2024-07-31 DIAGNOSIS — E66.813 CLASS 3 SEVERE OBESITY DUE TO EXCESS CALORIES WITHOUT SERIOUS COMORBIDITY WITH BODY MASS INDEX (BMI) OF 45.0 TO 49.9 IN ADULT (H): ICD-10-CM

## 2024-07-31 PROCEDURE — 99213 OFFICE O/P EST LOW 20 MIN: CPT | Mod: 95 | Performed by: PHYSICIAN ASSISTANT

## 2024-07-31 PROCEDURE — G2211 COMPLEX E/M VISIT ADD ON: HCPCS | Mod: 95 | Performed by: PHYSICIAN ASSISTANT

## 2024-07-31 RX ORDER — TOPIRAMATE 50 MG/1
TABLET, FILM COATED ORAL
Qty: 35 TABLET | Refills: 0 | Status: SHIPPED | OUTPATIENT
Start: 2024-07-31 | End: 2024-08-20

## 2024-07-31 NOTE — PROGRESS NOTES
"Giselle is a 44 year old who is being evaluated via a billable video visit.    How would you like to obtain your AVS? MyChart  If the video visit is dropped, the invitation should be resent by: Text to cell phone: [phone]  Will anyone else be joining your video visit? No    Assessment & Plan     ICD-10-CM    1. Class 3 severe obesity due to excess calories without serious comorbidity with body mass index (BMI) of 45.0 to 49.9 in adult (H)  E66.01 topiramate (TOPAMAX) 50 MG tablet    Z68.42         BMI  Estimated body mass index is 45.35 kg/m  as calculated from the following:    Height as of 11/10/23: 1.549 m (5' 1\").    Weight as of 11/10/23: 108.9 kg (240 lb).   Weight management plan: Specific weight management program called see below discussed    - Previously, discussed all medications at length, elected to start Phentermine   - Last visit increased Phentermine to 37.5 mg and added in Topamax 25 mg (for equivalent of product Qysmia)     Reports no side effects, about 4 lbs weight loss   - Recommend continue Phentermine 37.5 mg for the remaining 8 weeks     Will taper up Topamax to 50 mg for 2 weeks then 75 mg   -  reviewed, no concerns   - Reviewed use and side effects of both medications   - Recheck 1 month          Review of the result(s) of each unique test - None     Diagnosis or treatment significantly limited by social determinants of health - None     20 minutes spent on the date of the encounter doing chart review, history and exam, documentation and further activities as noted above    The patient indicates understanding of these issues and agrees with the plan.    Follow up: 1 month     Mickey Washington-CHUCHO Zamorano  [facility]           Dixon Desai is a 44 year old, presenting for the following health issues:  Weight Loss      7/31/2024     9:19 AM   Additional Questions   Roomed by Shonna FORMAN         7/31/2024     9:19 AM   Patient Reported Additional Medications " "  Patient reports taking the following new medications flax seed oil, calcium, fish oil, ashwagandha, D3     History of Present Illness       Reason for visit:  Follow up    She eats 4 or more servings of fruits and vegetables daily.She consumes 1 sweetened beverage(s) daily.She exercises with enough effort to increase her heart rate 30 to 60 minutes per day.  She exercises with enough effort to increase her heart rate 5 days per week.   She is taking medications regularly.       Medication Followup of Phentermine  Taking Medication as prescribed: yes  Side Effects:  None  Medication Helping Symptoms:  yes, feels like she has more energy but does not feel like it is helping anything else    - Down 4 lbs   - No migraines   - In the beginning had some issues sleeping but this is better now           Review of Systems  Constitutional, HEENT, cardiovascular, pulmonary, gi and gu systems are negative, except as otherwise noted.      Objective    Vitals - Patient Reported  Weight (Patient Reported): 102 kg (224 lb 12.8 oz)  Height (Patient Reported): 153.7 cm (5' 0.5\")  BMI (Based on Pt Reported Ht/Wt): 43.18  Pain Score: No Pain (0)    Physical Exam   GENERAL: alert and no distress  EYES: Eyes grossly normal to inspection.  No discharge or erythema, or obvious scleral/conjunctival abnormalities.  RESP: No audible wheeze, cough, or visible cyanosis.    SKIN: Visible skin clear. No significant rash, abnormal pigmentation or lesions.  NEURO: Cranial nerves grossly intact.  Mentation and speech appropriate for age.  PSYCH: Appropriate affect, tone, and pace of words    Diagnostics: none       Video-Visit Details    Type of service:  Video Visit   Originating Location (pt. Location): Home  Distant Location (provider location):  Off-site  Platform used for Video Visit: Gautam  Signed Electronically by: Michelle Mane PA-C    "

## 2024-08-20 DIAGNOSIS — E66.01 CLASS 3 SEVERE OBESITY DUE TO EXCESS CALORIES WITHOUT SERIOUS COMORBIDITY WITH BODY MASS INDEX (BMI) OF 45.0 TO 49.9 IN ADULT (H): ICD-10-CM

## 2024-08-20 DIAGNOSIS — E66.813 CLASS 3 SEVERE OBESITY DUE TO EXCESS CALORIES WITHOUT SERIOUS COMORBIDITY WITH BODY MASS INDEX (BMI) OF 45.0 TO 49.9 IN ADULT (H): ICD-10-CM

## 2024-08-20 RX ORDER — TOPIRAMATE 50 MG/1
100 TABLET, FILM COATED ORAL DAILY
Qty: 14 TABLET | Refills: 0 | Status: SHIPPED | OUTPATIENT
Start: 2024-08-21 | End: 2024-08-28

## 2024-08-28 ENCOUNTER — VIRTUAL VISIT (OUTPATIENT)
Dept: FAMILY MEDICINE | Facility: OTHER | Age: 44
End: 2024-08-28
Payer: COMMERCIAL

## 2024-08-28 DIAGNOSIS — E66.813 CLASS 3 SEVERE OBESITY DUE TO EXCESS CALORIES WITHOUT SERIOUS COMORBIDITY WITH BODY MASS INDEX (BMI) OF 45.0 TO 49.9 IN ADULT (H): ICD-10-CM

## 2024-08-28 DIAGNOSIS — E66.01 CLASS 3 SEVERE OBESITY DUE TO EXCESS CALORIES WITHOUT SERIOUS COMORBIDITY WITH BODY MASS INDEX (BMI) OF 45.0 TO 49.9 IN ADULT (H): ICD-10-CM

## 2024-08-28 PROCEDURE — G2211 COMPLEX E/M VISIT ADD ON: HCPCS | Mod: 95 | Performed by: PHYSICIAN ASSISTANT

## 2024-08-28 PROCEDURE — 99213 OFFICE O/P EST LOW 20 MIN: CPT | Mod: 95 | Performed by: PHYSICIAN ASSISTANT

## 2024-08-28 RX ORDER — TOPIRAMATE 100 MG/1
TABLET, FILM COATED ORAL
Qty: 35 TABLET | Refills: 0 | Status: SHIPPED | OUTPATIENT
Start: 2024-08-28 | End: 2024-09-23

## 2024-08-28 NOTE — PROGRESS NOTES
Giselle is a 44 year old who is being evaluated via a billable video visit.    How would you like to obtain your AVS? MyChart  If the video visit is dropped, the invitation should be resent by: Text to cell phone: [phone]  Will anyone else be joining your video visit? No    Assessment & Plan     ICD-10-CM    1. Class 3 severe obesity due to excess calories without serious comorbidity with body mass index (BMI) of 45.0 to 49.9 in adult (H)  E66.01 topiramate (TOPAMAX) 100 MG tablet    Z68.42         - Previously, discussed all medications at length, elected to start Phentermine, increased Phentermine to 37.5 mg and added in Topamax(for equivalent of product Qysmia)     Last visit increased Topamax to 50 mg for 14 days then 75 mg for 2 weeks     Reports no side effects, about another 5 lbs weight loss   - Recommend continue Phentermine 37.5 mg for the remaining 4 weeks     Will taper up Topamax to 100 mg for 2 weeks then 150 mg   -  reviewed, no concerns   - Reviewed use and side effects of both medications   - Recheck 1 month       Review of the result(s) of each unique test - none    Diagnosis or treatment significantly limited by social determinants of health - none     15 minutes spent on the date of the encounter doing chart review, history and exam, documentation and further activities as noted above    The patient indicates understanding of these issues and agrees with the plan.    Follow up: 1 month     Mickey Washington-CHUCHO Zamorano  [facility]   Giselle is a 44 year old, presenting for the following health issues:  Weight Loss    History of Present Illness       Reason for visit:  Follow up on how weight loss is going and review if medication is working.    She eats 4 or more servings of fruits and vegetables daily.She consumes 1 sweetened beverage(s) daily.She exercises with enough effort to increase her heart rate 30 to 60 minutes per day.  She exercises with  enough effort to increase her heart rate 5 days per week.   She is taking medications regularly.       Medication Followup of Topamax and Phentermine   Taking Medication as prescribed: yes  Side Effects:  None  Medication Helping Symptoms:  yes    - Seems to be going well   - Slowly moving forward   - Down another 5 lbs since last visit   - No side effects, maybe dry mouth   - Did 50 mg for 14 days then went 75 mg once in AM of Topamax       Review of Systems  Constitutional, neuro, ENT, endocrine, pulmonary, cardiac, gastrointestinal, genitourinary, musculoskeletal, integument and psychiatric systems are negative, except as otherwise noted.      Objective       Vitals:  No vitals were obtained today due to virtual visit.    Physical Exam   GENERAL: alert and no distress  EYES: Eyes grossly normal to inspection.  No discharge or erythema, or obvious scleral/conjunctival abnormalities.  RESP: No audible wheeze, cough, or visible cyanosis.    SKIN: Visible skin clear. No significant rash, abnormal pigmentation or lesions.  NEURO: Cranial nerves grossly intact.  Mentation and speech appropriate for age.  PSYCH: Appropriate affect, tone, and pace of words    Diagnostics: none       Video-Visit Details    Type of service:  Video Visit   Originating Location (pt. Location): Home  Distant Location (provider location):  Off-site  Platform used for Video Visit: Gautam  Signed Electronically by: Michelle Washington-CHUCHO Zamorano

## 2024-09-12 ENCOUNTER — OFFICE VISIT (OUTPATIENT)
Dept: FAMILY MEDICINE | Facility: CLINIC | Age: 44
End: 2024-09-12
Payer: COMMERCIAL

## 2024-09-12 VITALS
DIASTOLIC BLOOD PRESSURE: 87 MMHG | OXYGEN SATURATION: 99 % | HEART RATE: 91 BPM | RESPIRATION RATE: 20 BRPM | HEIGHT: 60 IN | WEIGHT: 217.8 LBS | BODY MASS INDEX: 42.76 KG/M2 | SYSTOLIC BLOOD PRESSURE: 116 MMHG | TEMPERATURE: 97.5 F

## 2024-09-12 DIAGNOSIS — Z12.31 ENCOUNTER FOR SCREENING MAMMOGRAM FOR BREAST CANCER: ICD-10-CM

## 2024-09-12 DIAGNOSIS — Z00.00 ENCOUNTER FOR ANNUAL PHYSICAL EXAM: Primary | ICD-10-CM

## 2024-09-12 DIAGNOSIS — Z13.6 SCREENING, ISCHEMIC HEART DISEASE: ICD-10-CM

## 2024-09-12 DIAGNOSIS — M65.941 SYNOVITIS OF RIGHT HAND: ICD-10-CM

## 2024-09-12 DIAGNOSIS — E66.01 MORBID OBESITY (H): ICD-10-CM

## 2024-09-12 DIAGNOSIS — Z13.6 CARDIOVASCULAR SCREENING; LDL GOAL LESS THAN 130: ICD-10-CM

## 2024-09-12 DIAGNOSIS — B37.0 CANDIDIASIS OF MOUTH: ICD-10-CM

## 2024-09-12 DIAGNOSIS — Z30.41 ENCOUNTER FOR SURVEILLANCE OF CONTRACEPTIVE PILLS: ICD-10-CM

## 2024-09-12 DIAGNOSIS — K13.0 ANGULAR CHEILITIS: ICD-10-CM

## 2024-09-12 DIAGNOSIS — B36.0 TINEA VERSICOLOR: ICD-10-CM

## 2024-09-12 DIAGNOSIS — Z13.820 SCREENING FOR OSTEOPOROSIS: ICD-10-CM

## 2024-09-12 DIAGNOSIS — Z12.11 SCREEN FOR COLON CANCER: ICD-10-CM

## 2024-09-12 PROCEDURE — 99396 PREV VISIT EST AGE 40-64: CPT | Performed by: INTERNAL MEDICINE

## 2024-09-12 RX ORDER — CLOTRIMAZOLE 1 %
CREAM (GRAM) TOPICAL 2 TIMES DAILY
Qty: 30 G | Refills: 2 | Status: SHIPPED | OUTPATIENT
Start: 2024-09-12 | End: 2024-09-25

## 2024-09-12 RX ORDER — CLOTRIMAZOLE 10 MG/1
10 LOZENGE ORAL
Qty: 35 LOZENGE | Refills: 1 | Status: SHIPPED | OUTPATIENT
Start: 2024-09-12 | End: 2024-09-25

## 2024-09-12 RX ORDER — ACETAMINOPHEN AND CODEINE PHOSPHATE 120; 12 MG/5ML; MG/5ML
0.35 SOLUTION ORAL DAILY
Qty: 84 TABLET | Refills: 3 | Status: SHIPPED | OUTPATIENT
Start: 2024-09-12

## 2024-09-12 ASSESSMENT — PAIN SCALES - GENERAL: PAINLEVEL: NO PAIN (0)

## 2024-09-12 NOTE — PROGRESS NOTES
Preventive Care Visit  [facility]  Gregor Benavides MD, Internal Medicine  Sep 12, 2024  {Provider  Link to SmartSet :226636}    {PROVIDER CHARTING PREFERENCE:634815}    Dixon Desai is a 44 year old, presenting for the following:  Physical, Medication Request (Refills, who can fill them?), and Derm Problem        9/12/2024     2:26 PM   Additional Questions   Roomed by Latonia   Accompanied by kid- Joaquina        [facility] Care Directive  Patient does not have a Health Care Directive or Living Will: {ADVANCE_DIRECTIVE_STATUS:783678}    HPI  ***  {MA/LPN/RN Pre-Provider Visit Orders- hCG/UA/Strep (Optional):278641}  Rash  Onset/Duration: 9/6/2024 and 9/11/2024  Description  Location: face by lip and left under arm   Character: round, flakey, painful, burning, red  Itching: no  Intensity:  severe  Progression of Symptoms:  worsening  Accompanying signs and symptoms:   Fever: No  Body aches or joint pain: YES  Sore throat symptoms: No  Recent cold symptoms: ear pain or infection   History:           Previous episodes of similar rash: None  New exposures:  None and medication phentermine and topiramate   Recent travel: YES  Exposure to similar rash: No  Precipitating or alleviating factors: none   Therapies tried and outcome: teatree and jelly   {additonal problems for provider to add (Optional):648414}      9/12/2024   General Health   How would you rate your overall physical health? (!) FAIR   Feel stress (tense, anxious, or unable to sleep) To some extent      (!) STRESS CONCERN      9/12/2024   Nutrition   Three or more servings of calcium each day? Yes   Diet: Regular (no restrictions)   How many servings of fruit and vegetables per day? (!) 2-3   How many sweetened beverages each day? 0-1            9/12/2024   Exercise   Days per week of moderate/strenous exercise 5 days   Average minutes spent exercising at this level 30 min            9/12/2024   Social Factors   Frequency of  gathering with friends or relatives Twice a week   Worry food won't last until get money to buy more No   Food not last or not have enough money for food? No   Do you have housing? (Housing is defined as stable permanent housing and does not include staying ouside in a car, in a tent, in an abandoned building, in an overnight shelter, or couch-surfing.) Yes   Are you worried about losing your housing? No   Lack of transportation? No   Unable to get utilities (heat,electricity)? No            9/12/2024   Dental   Dentist two times every year? Yes            9/12/2024   TB Screening   Were you born outside of the [place]? No          {Rooming Staff Patient needs a PHQ as part of the AWV.  Use this link to complete and then refresh the note to pull results Link to PHQ2 Assessment :556184}  {USE TO PULL IN PHQ RESULTS FOR TODAY:008986}      9/12/2024   Substance Use   Alcohol more than 3/day or more than 7/wk Not Applicable   Do you use any other substances recreationally? No        Social History     Tobacco Use    Smoking status: Never     Passive exposure: Never    Smokeless tobacco: Never   Vaping Use    Vaping status: Never Used   Substance Use Topics    Alcohol use: No    Drug use: No     {Provider  If there are gaps in the social history shown above, please follow the link to update and then refresh the note Link to Social and Substance History :717621}      11/10/2023   LAST FHS-7 RESULTS   1st degree relative breast or ovarian cancer Unknown   Any relative bilateral breast cancer No   Any male have breast cancer No   Any ONE woman have BOTH breast AND ovarian cancer Yes   Any woman with breast cancer before 50yrs Yes   2 or more relatives with breast AND/OR ovarian cancer Yes   2 or more relatives with breast AND/OR bowel cancer No        {If any of the questions to the FHS7 are answered yes, consider referral for genetic counseling.    Additional indications for genetic referral include personal history of breast  "or ovarian cancer, genetic mutation in 1st degree relative which increases risk of breast cancer including BRCA1, BRCA2, JAYDEN, PALB 2, TP53, CHEK2, PTEN, CDH1, STK11 (per ACS) and/or 1st degree relative with history of pancreatic or high-risk prostate cancer (per NCCN):370012}   {Mammogram Decision Support (Optional):780325}        9/12/2024   STI Screening   New sexual partner(s) since last STI/HIV test? No        History of abnormal Pap smear: { :451913}        Latest Ref Rng & Units 7/10/2020     9:53 AM 7/10/2020     9:46 AM 5/24/2019    12:00 AM   PAP / HPV   PAP (Historical)  NIL      HPV 16 DNA NEG^Negative  Negative     HPV 18 DNA NEG^Negative  Negative     Other HR HPV NEG^Negative  Negative     PAP-ABSTRACT    See Scanned Document           This result is from an external source.     ASCVD Risk   The 10-year ASCVD risk score (Roseanne MINOR, et al., 2019) is: 0.8%    Values used to calculate the score:      Age: 44 years      Sex: Female      Is Non- : No      Diabetic: No      Tobacco smoker: No      Systolic Blood Pressure: 116 mmHg      Is BP treated: No      HDL Cholesterol: 48 mg/dL      Total Cholesterol: 190 mg/dL        9/12/2024   Contraception/Family Planning   Questions about contraception or family planning No        {Provider  REQUIRED FOR AWV Use the storyboard to review patient history, after sections have been marked as reviewed, refresh note to capture documentation:132530}   Reviewed and updated as needed this visit by Provider                    {HISTORY OPTIONS (Optional):430538}    {ROS Picklists (Optional):268500}     Objective    Exam  /87 (BP Location: Left arm, Patient Position: Sitting, Cuff Size: Adult Large)   Pulse 91   Temp 97.5  F (36.4  C)   Resp 20   Ht 1.528 m (5' 0.16\")   Wt 98.8 kg (217 lb 12.8 oz)   LMP 09/04/2024 (Exact Date)   SpO2 99%   BMI 42.31 kg/m     Estimated body mass index is 42.31 kg/m  as calculated from the " "following:    Height as of this encounter: 1.528 m (5' 0.16\").    Weight as of this encounter: 98.8 kg (217 lb 12.8 oz).    Physical Exam  {Exam Choices (Optional):401116}        Signed Electronically by: Gregor Benavides MD  {Email feedback regarding this note to [email or web address]   :203475}  "

## 2024-09-15 ENCOUNTER — LAB (OUTPATIENT)
Dept: LAB | Facility: CLINIC | Age: 44
End: 2024-09-15
Payer: COMMERCIAL

## 2024-09-15 DIAGNOSIS — Z13.6 CARDIOVASCULAR SCREENING; LDL GOAL LESS THAN 130: ICD-10-CM

## 2024-09-15 DIAGNOSIS — M65.941 SYNOVITIS OF RIGHT HAND: ICD-10-CM

## 2024-09-15 DIAGNOSIS — E66.01 MORBID OBESITY (H): ICD-10-CM

## 2024-09-15 LAB
ALBUMIN SERPL BCG-MCNC: 4.2 G/DL (ref 3.5–5.2)
ALP SERPL-CCNC: 67 U/L (ref 40–150)
ALT SERPL W P-5'-P-CCNC: 8 U/L (ref 0–50)
ANION GAP SERPL CALCULATED.3IONS-SCNC: 10 MMOL/L (ref 7–15)
AST SERPL W P-5'-P-CCNC: 18 U/L (ref 0–45)
BASOPHILS # BLD AUTO: 0 10E3/UL (ref 0–0.2)
BASOPHILS NFR BLD AUTO: 0 %
BILIRUB SERPL-MCNC: 0.3 MG/DL
BUN SERPL-MCNC: 11.1 MG/DL (ref 6–20)
CALCIUM SERPL-MCNC: 8.8 MG/DL (ref 8.8–10.4)
CHLORIDE SERPL-SCNC: 108 MMOL/L (ref 98–107)
CHOLEST SERPL-MCNC: 173 MG/DL
CREAT SERPL-MCNC: 0.83 MG/DL (ref 0.51–0.95)
EGFRCR SERPLBLD CKD-EPI 2021: 88 ML/MIN/1.73M2
EOSINOPHIL # BLD AUTO: 0.2 10E3/UL (ref 0–0.7)
EOSINOPHIL NFR BLD AUTO: 2 %
ERYTHROCYTE [DISTWIDTH] IN BLOOD BY AUTOMATED COUNT: 13.8 % (ref 10–15)
FASTING STATUS PATIENT QL REPORTED: YES
FASTING STATUS PATIENT QL REPORTED: YES
GLUCOSE SERPL-MCNC: 92 MG/DL (ref 70–99)
HCO3 SERPL-SCNC: 22 MMOL/L (ref 22–29)
HCT VFR BLD AUTO: 41 % (ref 35–47)
HDLC SERPL-MCNC: 35 MG/DL
HGB BLD-MCNC: 13.7 G/DL (ref 11.7–15.7)
IMM GRANULOCYTES # BLD: 0 10E3/UL
IMM GRANULOCYTES NFR BLD: 0 %
LDLC SERPL CALC-MCNC: 119 MG/DL
LYMPHOCYTES # BLD AUTO: 2.9 10E3/UL (ref 0.8–5.3)
LYMPHOCYTES NFR BLD AUTO: 32 %
MCH RBC QN AUTO: 28.6 PG (ref 26.5–33)
MCHC RBC AUTO-ENTMCNC: 33.4 G/DL (ref 31.5–36.5)
MCV RBC AUTO: 86 FL (ref 78–100)
MONOCYTES # BLD AUTO: 0.7 10E3/UL (ref 0–1.3)
MONOCYTES NFR BLD AUTO: 8 %
NEUTROPHILS # BLD AUTO: 5.2 10E3/UL (ref 1.6–8.3)
NEUTROPHILS NFR BLD AUTO: 58 %
NONHDLC SERPL-MCNC: 138 MG/DL
PLATELET # BLD AUTO: 321 10E3/UL (ref 150–450)
POTASSIUM SERPL-SCNC: 3.8 MMOL/L (ref 3.4–5.3)
PROT SERPL-MCNC: 6.8 G/DL (ref 6.4–8.3)
RBC # BLD AUTO: 4.79 10E6/UL (ref 3.8–5.2)
SODIUM SERPL-SCNC: 140 MMOL/L (ref 135–145)
TRIGL SERPL-MCNC: 97 MG/DL
TSH SERPL DL<=0.005 MIU/L-ACNC: 1.13 UIU/ML (ref 0.3–4.2)
WBC # BLD AUTO: 9.1 10E3/UL (ref 4–11)

## 2024-09-15 PROCEDURE — 84443 ASSAY THYROID STIM HORMONE: CPT

## 2024-09-15 PROCEDURE — 36415 COLL VENOUS BLD VENIPUNCTURE: CPT

## 2024-09-15 PROCEDURE — 86038 ANTINUCLEAR ANTIBODIES: CPT

## 2024-09-15 PROCEDURE — 80061 LIPID PANEL: CPT

## 2024-09-15 PROCEDURE — 85025 COMPLETE CBC W/AUTO DIFF WBC: CPT | Performed by: INTERNAL MEDICINE

## 2024-09-15 PROCEDURE — 80053 COMPREHEN METABOLIC PANEL: CPT | Performed by: INTERNAL MEDICINE

## 2024-09-17 LAB — ANA SER QL IF: NEGATIVE

## 2024-09-22 NOTE — PROGRESS NOTES
SUBJECTIVE:   Giselle is a 44 year old, presenting for the following:    Health Care Directive  Patient does not have a Health Care Directive or Living Will: Not discussed during today's visit.    Today's PHQ-2 Score:       5/7/2024     8:00 PM   PHQ-2 ( 1999 [employer])   Q1: Little interest or pleasure in doing things 0   Q2: Feeling down, depressed or hopeless 1   PHQ-2 Score 1   Q1: Little interest or pleasure in doing things Not at all   Q2: Feeling down, depressed or hopeless Several days   PHQ-2 Score 1     Rash  Onset/Duration: 9/6/2024 and 9/11/2024  Description  Location: face by lip and left under arm   Character: round, flakey, painful, burning, red  Itching: no  Intensity:  severe  Progression of Symptoms:  worsening  Accompanying signs and symptoms:   Fever: No  Body aches or joint pain: YES  Sore throat symptoms: No  Recent cold symptoms: ear pain or infection   History:           Previous episodes of similar rash: None  New exposures:  None and medication phentermine and topiramate   Recent travel: YES  Exposure to similar rash: No  Precipitating or alleviating factors: none   Therapies tried and outcome: over-the-counter medications aren't effective.        9/12/2024   General Health   How would you rate your overall physical health? (!) FAIR   Feel stress (tense, anxious, or unable to sleep) To some extent      (!) STRESS CONCERN      9/12/2024   Nutrition   Three or more servings of calcium each day? Yes   Diet: Regular (no restrictions)   How many servings of fruit and vegetables per day? (!) 2-3   How many sweetened beverages each day? 0-1            9/12/2024   Exercise   Days per week of moderate/strenous exercise 5 days   Average minutes spent exercising at this level 30 min            9/12/2024   Social Factors   Frequency of gathering with friends or relatives Twice a week   Worry food won't last until get money to buy more No   Food not last or not have enough money for food? No   Do you have  housing? (Housing is defined as stable permanent housing and does not include staying ouside in a car, in a tent, in an abandoned building, in an overnight shelter, or couch-surfing.) Yes   Are you worried about losing your housing? No   Lack of transportation? No   Unable to get utilities (heat,electricity)? No            9/12/2024   Dental   Dentist two times every year? Yes            9/12/2024   TB Screening   Were you born outside of the [place]? No            9/12/2024   Substance Use   Alcohol more than 3/day or more than 7/wk Not Applicable   Do you use any other substances recreationally? No        Social History     Tobacco Use    Smoking status: Never     Passive exposure: Never    Smokeless tobacco: Never   Vaping Use    Vaping status: Never Used   Substance Use Topics    Alcohol use: No    Drug use: No           11/10/2023   LAST FHS-7 RESULTS   1st degree relative breast or ovarian cancer Unknown   Any relative bilateral breast cancer No   Any male have breast cancer No   Any ONE woman have BOTH breast AND ovarian cancer Yes   Any woman with breast cancer before 50yrs Yes   2 or more relatives with breast AND/OR ovarian cancer Yes   2 or more relatives with breast AND/OR bowel cancer No      Mammogram Screening - Mammogram every 1-2 years updated in Health Maintenance based on mutual decision making        9/12/2024   STI Screening   New sexual partner(s) since last STI/HIV test? No        History of abnormal Pap smear: No - age 30- 64 PAP with HPV every 5 years recommended        Latest Ref Rng & Units 7/10/2020     9:53 AM 7/10/2020     9:46 AM 5/24/2019    12:00 AM   PAP / HPV   PAP (Historical)  NIL      HPV 16 DNA NEG^Negative  Negative     HPV 18 DNA NEG^Negative  Negative     Other HR HPV NEG^Negative  Negative     PAP-ABSTRACT    See Scanned Document           This result is from an external source.     ASCVD Risk   The 10-year ASCVD risk score (Roseanne DK, et al., 2019) is: 0.8%    Values  used to calculate the score:      Age: 44 years      Sex: Female      Is Non- : No      Diabetic: No      Tobacco smoker: No      Systolic Blood Pressure: 116 mmHg      Is BP treated: No      HDL Cholesterol: 48 mg/dL      Total Cholesterol: 190 mg/dL        9/12/2024   Contraception/Family Planning   Questions about contraception or family planning No        Labs reviewed in EPIC  BP Readings from Last 3 Encounters:   09/12/24 116/87   11/10/23 (!) 146/81   10/11/21 (!) 147/77    Wt Readings from Last 3 Encounters:   09/12/24 98.8 kg (217 lb 12.8 oz)   11/10/23 108.9 kg (240 lb)   10/11/21 99.4 kg (219 lb 3.2 oz)                  Patient Active Problem List   Diagnosis    CARDIOVASCULAR SCREENING; LDL GOAL LESS THAN 160    Obesity    Malignant melanoma (H)     Past Surgical History:   Procedure Laterality Date    TONSILLECTOMY      ZZC STABISM SURG,PREV EYE SURG,NOT MUSC         Social History     Tobacco Use    Smoking status: Never     Passive exposure: Never    Smokeless tobacco: Never   Substance Use Topics    Alcohol use: No     Family History   Problem Relation Age of Onset    Lipids Mother     Diabetes Maternal Grandmother          Allergies   Allergen Reactions    Sulfa Antibiotics Hives and Swelling     Swelling-throat          Reviewed and updated as needed this visit by clinical staff   Tobacco  Allergies  Meds  Problems  Med Hx  Surg Hx           Reviewed and updated as needed this visit by Provider     Meds  Problems  Med Hx  Surg Hx             Review of Systems  CONSTITUTIONAL:POSITIVE  for morbid obesity.  INTEGUMENTARY/SKIN: As above.  EYES: NEGATIVE for vision changes or irritation  ENT/MOUTH: NEGATIVE for ear, mouth and throat problems  RESP: NEGATIVE for significant cough or SOB  BREAST: NEGATIVE for masses, tenderness or discharge  CV: NEGATIVE for chest pain, palpitations or peripheral edema  GI: NEGATIVE for nausea, abdominal pain, heartburn, or change in  "bowel habits  : NEGATIVE for frequency, dysuria, or hematuria  MUSCULOSKELETAL:POSITIVE  for swelling of right hand.  NEURO: NEGATIVE for weakness, dizziness or paresthesias  ENDOCRINE: NEGATIVE for temperature intolerance, skin/hair changes  HEME: NEGATIVE for bleeding problems  PSYCHIATRIC: NEGATIVE for changes in mood or affect    OBJECTIVE:   /87 (BP Location: Left arm, Patient Position: Sitting, Cuff Size: Adult Large)   Pulse 91   Temp 97.5  F (36.4  C)   Resp 20   Ht 1.528 m (5' 0.16\")   Wt 98.8 kg (217 lb 12.8 oz)   LMP 09/04/2024 (Exact Date)   SpO2 99%   BMI 42.31 kg/m     Estimated body mass index is 42.31 kg/m  as calculated from the following:    Height as of this encounter: 1.528 m (5' 0.16\").    Weight as of this encounter: 98.8 kg (217 lb 12.8 oz).  Physical Exam  GENERAL: alert and no distress  EYES: Eyes grossly normal to inspection and conjunctivae and sclerae normal  HENT: normal cephalic/atraumatic and oral mucous membranes moist  NECK: no adenopathy and thyroid normal to palpation  RESP: lungs clear to auscultation - no rales, rhonchi or wheezes  CV: regular rates and rhythm and no peripheral edema  ABDOMEN: soft, nontender and bowel sounds normal  MS: no gross musculoskeletal defects noted, no edema  SKIN: Fissure noted in right lateral commissure.  NEURO: Normal strength and tone, mentation intact and speech normal  PSYCH: mentation appears normal, affect normal/bright    Diagnostic Test Results:  Results for orders placed or performed in visit on 09/12/24   Comprehensive metabolic panel     Status: Abnormal   Result Value Ref Range    Sodium 140 135 - 145 mmol/L    Potassium 3.8 3.4 - 5.3 mmol/L    Carbon Dioxide (CO2) 22 22 - 29 mmol/L    Anion Gap 10 7 - 15 mmol/L    Urea Nitrogen 11.1 6.0 - 20.0 mg/dL    Creatinine 0.83 0.51 - 0.95 mg/dL    GFR Estimate 88 >60 mL/min/1.73m2    Calcium 8.8 8.8 - 10.4 mg/dL    Chloride 108 (H) 98 - 107 mmol/L    Glucose 92 70 - 99 mg/dL    " Alkaline Phosphatase 67 40 - 150 U/L    AST 18 0 - 45 U/L    ALT 8 0 - 50 U/L    Protein Total 6.8 6.4 - 8.3 g/dL    Albumin 4.2 3.5 - 5.2 g/dL    Bilirubin Total 0.3 <=1.2 mg/dL    Patient Fasting > 8hrs? Yes    CBC with platelets and differential     Status: None   Result Value Ref Range    WBC Count 9.1 4.0 - 11.0 10e3/uL    RBC Count 4.79 3.80 - 5.20 10e6/uL    Hemoglobin 13.7 11.7 - 15.7 g/dL    Hematocrit 41.0 35.0 - 47.0 %    MCV 86 78 - 100 fL    MCH 28.6 26.5 - 33.0 pg    MCHC 33.4 31.5 - 36.5 g/dL    RDW 13.8 10.0 - 15.0 %    Platelet Count 321 150 - 450 10e3/uL    % Neutrophils 58 %    % Lymphocytes 32 %    % Monocytes 8 %    % Eosinophils 2 %    % Basophils 0 %    % Immature Granulocytes 0 %    Absolute Neutrophils 5.2 1.6 - 8.3 10e3/uL    Absolute Lymphocytes 2.9 0.8 - 5.3 10e3/uL    Absolute Monocytes 0.7 0.0 - 1.3 10e3/uL    Absolute Eosinophils 0.2 0.0 - 0.7 10e3/uL    Absolute Basophils 0.0 0.0 - 0.2 10e3/uL    Absolute Immature Granulocytes 0.0 <=0.4 10e3/uL   CBC with platelets and differential     Status: None    Narrative    The following orders were created for panel order CBC with platelets and differential.  Procedure                               Abnormality         Status                     ---------                               -----------         ------                     CBC with platelets and d...[[id number]]                      Final result                 Please view results for these tests on the individual orders.       ASSESSMENT/PLAN:     Encounter for annual physical exam  - CBC with platelets and differential  - Comprehensive metabolic panel    Screen for colon cancer  - Colonoscopy Screening  Referral; Future    Encounter for surveillance of contraceptive pills  - norethindrone (MICRONOR) 0.35 MG tablet; Take 1 tablet (0.35 mg) by mouth daily.    Encounter for screening mammogram for breast cancer  - MA Screen Bilateral w/Sonido; Future    Screening, ischemic heart  disease  - CT Coronary Calcium Scan; Future    Screening for osteoporosis  - DX Bone Density; Future    Angular cheilitis  - clotrimazole (LOTRIMIN) 1 % external cream; Apply topically 2 times daily.    Tinea versicolor  - clotrimazole (LOTRIMIN) 1 % external cream; Apply topically 2 times daily.    Candidiasis of mouth  - clotrimazole (MYCELEX) 10 MG lozenge; Place 1 lozenge (10 mg) inside cheek 5 times daily.    CARDIOVASCULAR SCREENING; LDL GOAL LESS THAN 130  - Lipid panel reflex to direct LDL Fasting; Future  - TSH with free T4 reflex; Future    Synovitis of right hand  - Anti Nuclear Montserrat IgG by IFA with Reflex; Future    Morbid obesity (H)  - TSH with free T4 reflex; Future     Patient has been advised of split billing requirements and indicates understanding: Yes      Counseling  Special attention given to:        Regular exercise       Healthy diet/nutrition       The 10-year ASCVD risk score (Roseanne MINOR, et al., 2019) is: 1.1%    Values used to calculate the score:      Age: 44 years      Sex: Female      Is Non- : No      Diabetic: No      Tobacco smoker: No      Systolic Blood Pressure: 116 mmHg      Is BP treated: No      HDL Cholesterol: 35 mg/dL      Total Cholesterol: 173 mg/dL        She reports that she has never smoked. She has never been exposed to tobacco smoke. She has never used smokeless tobacco.            Signed Electronically by: Gregor Benavides MD

## 2024-09-23 DIAGNOSIS — E66.01 CLASS 3 SEVERE OBESITY DUE TO EXCESS CALORIES WITHOUT SERIOUS COMORBIDITY WITH BODY MASS INDEX (BMI) OF 45.0 TO 49.9 IN ADULT (H): ICD-10-CM

## 2024-09-23 DIAGNOSIS — E66.813 CLASS 3 SEVERE OBESITY DUE TO EXCESS CALORIES WITHOUT SERIOUS COMORBIDITY WITH BODY MASS INDEX (BMI) OF 45.0 TO 49.9 IN ADULT (H): ICD-10-CM

## 2024-09-23 RX ORDER — TOPIRAMATE 100 MG/1
150 TABLET, FILM COATED ORAL DAILY
Qty: 135 TABLET | Refills: 1 | Status: SHIPPED | OUTPATIENT
Start: 2024-09-23 | End: 2024-09-26

## 2024-09-25 ENCOUNTER — VIRTUAL VISIT (OUTPATIENT)
Dept: FAMILY MEDICINE | Facility: OTHER | Age: 44
End: 2024-09-25
Payer: COMMERCIAL

## 2024-09-25 ENCOUNTER — MYC MEDICAL ADVICE (OUTPATIENT)
Dept: FAMILY MEDICINE | Facility: OTHER | Age: 44
End: 2024-09-25

## 2024-09-25 DIAGNOSIS — E66.813 CLASS 3 SEVERE OBESITY DUE TO EXCESS CALORIES WITHOUT SERIOUS COMORBIDITY WITH BODY MASS INDEX (BMI) OF 45.0 TO 49.9 IN ADULT (H): ICD-10-CM

## 2024-09-25 DIAGNOSIS — E66.01 CLASS 3 SEVERE OBESITY DUE TO EXCESS CALORIES WITHOUT SERIOUS COMORBIDITY WITH BODY MASS INDEX (BMI) OF 45.0 TO 49.9 IN ADULT (H): ICD-10-CM

## 2024-09-25 PROCEDURE — 99213 OFFICE O/P EST LOW 20 MIN: CPT | Mod: 95 | Performed by: PHYSICIAN ASSISTANT

## 2024-09-25 NOTE — PROGRESS NOTES
Giselle is a 44 year old who is being evaluated via a billable video visit.    How would you like to obtain your AVS? MyChart  If the video visit is dropped, the invitation should be resent by: Text to cell phone: [phone]  Will anyone else be joining your video visit? No    Assessment & Plan     ICD-10-CM    1. Class 3 severe obesity due to excess calories without serious comorbidity with body mass index (BMI) of 45.0 to 49.9 in adult (H)  E66.01     Z68.42           - Patient working on weight loss, had side effects to Phentermine so was discontinued last month     Last visit did increase Topamax to 100 mg for 2 weeks and then 150 for 2 weeks      Reports went well with no side effects     Losing just very slowly, maybe 2-3 lbs   - recommend staying at 150 mg   - Reviewed use and side effects of medication   - Recheck 1 month          Review of the result(s) of each unique test - None     Diagnosis or treatment significantly limited by social determinants of health - None     20 minutes spent on the date of the encounter doing chart review, history and exam, documentation and further activities as noted above    The patient indicates understanding of these issues and agrees with the plan.    Follow up: 6-8 weeks     Mickey Washington-CHUCHO Zamorano  [facility]   Giselle is a 44 year old, presenting for the following health issues:  Weight Loss        9/25/2024    10:33 AM   Additional Questions   Roomed by fatou   Accompanied by self       Patient will weight herself before the visit    History of Present Illness       Reason for visit:  Follow up on how my weight loss is going and to discuss if I need to make any changes or if the doctor feels she needs to make any dosage or medication changes for me.    She eats 2-3 servings of fruits and vegetables daily.She consumes 1 sweetened beverage(s) daily.She exercises with enough effort to increase her heart rate 20 to 29  minutes per day.  She exercises with enough effort to increase her heart rate 4 days per week.   She is taking medications regularly.     - Weight down to 217 lbs at PCP visit      Today 214     Just a few lbs since last visit   - PCP discontinued phentermine?   - Had lips swell     So stopped Phentermine   - 100 mg for 14 days then 150 for the last 2 weeks         Review of Systems  Constitutional, HEENT, cardiovascular, pulmonary, gi and gu systems are negative, except as otherwise noted.      Objective    Vitals - Patient Reported  Weight (Patient Reported): 98.4 kg (217 lb)    Physical Exam   GENERAL: alert and no distress  EYES: Eyes grossly normal to inspection.  No discharge or erythema, or obvious scleral/conjunctival abnormalities.  RESP: No audible wheeze, cough, or visible cyanosis.    SKIN: Visible skin clear. No significant rash, abnormal pigmentation or lesions.  NEURO: Cranial nerves grossly intact.  Mentation and speech appropriate for age.  PSYCH: Appropriate affect, tone, and pace of words    Diagnostics: none       Video-Visit Details    Type of service:  Video Visit   Originating Location (pt. Location): Home  Distant Location (provider location):  Off-site  Platform used for Video Visit: Gautam  Signed Electronically by: Michelle Mane PA-C

## 2024-09-26 RX ORDER — TOPIRAMATE 100 MG/1
150 TABLET, FILM COATED ORAL DAILY
Qty: 135 TABLET | Refills: 1 | Status: SHIPPED | OUTPATIENT
Start: 2024-09-26

## 2024-10-03 ENCOUNTER — HOSPITAL ENCOUNTER (OUTPATIENT)
Facility: AMBULATORY SURGERY CENTER | Age: 44
End: 2024-10-03
Attending: INTERNAL MEDICINE
Payer: COMMERCIAL

## 2024-10-03 ENCOUNTER — TELEPHONE (OUTPATIENT)
Dept: GASTROENTEROLOGY | Facility: CLINIC | Age: 44
End: 2024-10-03
Payer: COMMERCIAL

## 2024-10-03 DIAGNOSIS — Z12.11 SCREEN FOR COLON CANCER: Primary | ICD-10-CM

## 2024-10-03 NOTE — TELEPHONE ENCOUNTER
"Endoscopy Scheduling Screen    Have you had any respiratory illness or flu-like symptoms in the last 10 days?  No    What is your communication preference for Instructions and/or Bowel Prep?   MyChart    What insurance is in the chart?  Other:  [facility]    Ordering/Referring Provider: Makayla   (If ordering provider performs procedure, schedule with ordering provider unless otherwise instructed. )    BMI: Estimated body mass index is 42.31 kg/m  as calculated from the following:    Height as of 9/12/24: 1.528 m (5' 0.16\").    Weight as of 9/12/24: 98.8 kg (217 lb 12.8 oz).     Sedation Ordered  moderate sedation.   If patient BMI > 50 do not schedule in [facility].    If patient BMI > 45 do not schedule at [facility].    Are you taking methadone or Suboxone?  NO, No RN review required.    Have you been diagnosed and are being treated for severe PTSD or severe anxiety?  NO, No RN review required.    Are you taking any prescription medications for pain 3 or more times per week?   NO, No RN review required.    Do you have a history of malignant hyperthermia?  No    (Females) Are you currently pregnant?        Have you been diagnosed or told you have pulmonary hypertension?   No    Do you have an LVAD?  No    Have you been told you have moderate to severe sleep apnea?  No.    Have you been told you have COPD, asthma, or any other lung disease?  No    Do you have any heart conditions?  No     Have you ever had or are you waiting for an organ transplant?  No. Continue scheduling, no site restrictions.    Have you had a stroke or transient ischemic attack (TIA aka \"mini stroke\" in the last 6 months?   No    Have you been diagnosed with or been told you have cirrhosis of the liver?   No.    Are you currently on dialysis?   No    Do you need assistance transferring?   No    BMI: Estimated body mass index is 42.31 kg/m  as calculated from the following:    Height as of 9/12/24: 1.528 m (5' 0.16\").    Weight as of 9/12/24: 98.8 kg (217 lb 12.8 " oz).     Is patients BMI > 40 and scheduling location [facility]?  No    Do you take an injectable or oral medication for weight loss or diabetes (excluding insulin)?  Yes, hold time can be up to 7 days. Please consult with you prescribing provider to discuss endoscopy recommendations. (Please schedule at least 7 days out.) Topomax    Do you take the medication Naltrexone?  No    Do you take blood thinners?  No       Prep   Are you currently on dialysis or do you have chronic kidney disease?  No    Do you have a diagnosis of diabetes?  No    Do you have a diagnosis of cystic fibrosis (CF)?  No    On a regular basis do you go 3 -5 days between bowel movements?  No    BMI > 40?  Yes (Extended Prep)    Preferred Pharmacy:    [id number] [place] [facility] [address] [facility]  [address] [facility] [address]  Phone: [phone] Fax: [phone]    Final Scheduling Details     Procedure scheduled  Colonoscopy    Surgeon:  Fred     Date of procedure:  11/8/24     Pre-OP / PAC:   No - Not required for this site.    Location  [facility] - [facility] - Patient preference.    Sedation   Moderate Sedation - Per order.      Patient Reminders:   You will receive a call from a Nurse to review instructions and health history.  This assessment must be completed prior to your procedure.  Failure to complete the Nurse assessment may result in the procedure being cancelled.      On the day of your procedure, please designate an adult(s) who can drive you home stay with you for the next 24 hours. The medicines used in the exam will make you sleepy. You will not be able to drive.      You cannot take public transportation, ride share services, or non-medical taxi service without a responsible caregiver.  Medical transport services are allowed with the requirement that a responsible caregiver will receive you at your destination.  We require that drivers and caregivers are confirmed prior to your procedure.

## 2024-10-11 ENCOUNTER — ANCILLARY ORDERS (OUTPATIENT)
Dept: FAMILY MEDICINE | Facility: CLINIC | Age: 44
End: 2024-10-11

## 2024-10-11 ENCOUNTER — TELEPHONE (OUTPATIENT)
Dept: GASTROENTEROLOGY | Facility: CLINIC | Age: 44
End: 2024-10-11
Payer: COMMERCIAL

## 2024-10-11 DIAGNOSIS — Z13.6 SCREENING, ISCHEMIC HEART DISEASE: Primary | ICD-10-CM

## 2024-10-11 NOTE — TELEPHONE ENCOUNTER
Caller:     Reason for Reschedule/Cancellation   (please be detailed, any staff messages or encounters to note?): CONFLICT      Prior to reschedule please review:  Ordering Provider:     STEPHAN SAINZ     Sedation Determined: CS  Does patient have any ASC Exclusions, please identify?:       Notes on Cancelled Procedure:  Procedure: Lower Endoscopy [Colonoscopy]   Date: 11/8  Location: [facility]; [address], [place] [address]   Surgeon: LOUISE      Rescheduled: Yes,   Procedure: Lower Endoscopy [Colonoscopy]    Date: 11/22   Location: [facility]; [address], [place] [address]    Surgeon: JESUS   Sedation Level Scheduled  CS ,  Reason for Sedation Level ORDER   Instructions updated and sent: Y     Does patient need PAC or Pre -Op Rescheduled? : N       Did you cancel or rescheduled an EUS procedure? No.

## 2024-11-01 ENCOUNTER — ANCILLARY PROCEDURE (OUTPATIENT)
Dept: MAMMOGRAPHY | Facility: CLINIC | Age: 44
End: 2024-11-01
Attending: INTERNAL MEDICINE
Payer: COMMERCIAL

## 2024-11-01 ENCOUNTER — ANCILLARY PROCEDURE (OUTPATIENT)
Dept: BONE DENSITY | Facility: CLINIC | Age: 44
End: 2024-11-01
Attending: INTERNAL MEDICINE
Payer: COMMERCIAL

## 2024-11-01 DIAGNOSIS — Z12.31 ENCOUNTER FOR SCREENING MAMMOGRAM FOR BREAST CANCER: ICD-10-CM

## 2024-11-01 DIAGNOSIS — Z13.820 SCREENING FOR OSTEOPOROSIS: ICD-10-CM

## 2024-11-01 PROCEDURE — 77063 BREAST TOMOSYNTHESIS BI: CPT | Mod: TC | Performed by: RADIOLOGY

## 2024-11-01 PROCEDURE — 77080 DXA BONE DENSITY AXIAL: CPT | Mod: TC | Performed by: PHYSICIAN ASSISTANT

## 2024-11-01 PROCEDURE — 77067 SCR MAMMO BI INCL CAD: CPT | Mod: TC | Performed by: RADIOLOGY

## 2024-11-01 RX ORDER — BISACODYL 5 MG/1
TABLET, DELAYED RELEASE ORAL
Qty: 4 TABLET | Refills: 0 | Status: SHIPPED | OUTPATIENT
Start: 2024-11-01 | End: 2024-11-06

## 2024-11-01 NOTE — TELEPHONE ENCOUNTER
Extended Golytely Bowel Prep  recommended due to BMI > 40.  Instructions were sent via SnapSense. Bowel prep was sent 11/1/2024 to      [facility] [address] [place], [place] - [address] [facility].

## 2024-11-06 ENCOUNTER — VIRTUAL VISIT (OUTPATIENT)
Dept: FAMILY MEDICINE | Facility: OTHER | Age: 44
End: 2024-11-06
Payer: COMMERCIAL

## 2024-11-06 DIAGNOSIS — E66.01 CLASS 3 SEVERE OBESITY DUE TO EXCESS CALORIES WITHOUT SERIOUS COMORBIDITY WITH BODY MASS INDEX (BMI) OF 45.0 TO 49.9 IN ADULT (H): ICD-10-CM

## 2024-11-06 DIAGNOSIS — E66.813 CLASS 3 SEVERE OBESITY DUE TO EXCESS CALORIES WITHOUT SERIOUS COMORBIDITY WITH BODY MASS INDEX (BMI) OF 45.0 TO 49.9 IN ADULT (H): ICD-10-CM

## 2024-11-06 PROCEDURE — 99213 OFFICE O/P EST LOW 20 MIN: CPT | Mod: 95 | Performed by: PHYSICIAN ASSISTANT

## 2024-11-06 NOTE — PATIENT INSTRUCTIONS
- Weight loss injectables      Wegovy, Zepbound, and Saxenda      Check for insurance coverage     - Recheck 2-3 months

## 2024-11-06 NOTE — PROGRESS NOTES
Giselle is a 44 year old who is being evaluated via a billable video visit.    How would you like to obtain your AVS? MyChart  If the video visit is dropped, the invitation should be resent by: Text to cell phone: [phone]  Will anyone else be joining your video visit? No    Assessment & Plan     ICD-10-CM    1. Class 3 severe obesity due to excess calories without serious comorbidity with body mass index (BMI) of 45.0 to 49.9 in adult (H)  E66.813     Z68.42     E66.01           - Patient working on weight loss, had side effects to Phentermine so was discontinued, continues on Topamax, last visit no changes made and continues on 150 mg    Losing just very slowly, down about 4 lbs since last visit   - recommend staying at 150 mg   - Reviewed use and side effects of medication   - Was recommended by PCP to consider GLP-1 medication      We had previously discussed this and patient was not considering this due to not wanting to be on a medication long term       Reviewed this again, gave medication names, recommend checking with insurance as can be quite expensive   - For now, will continue on Topamax and lifestyle changes   - Recheck 2-3 months       Review of the result(s) of each unique test - None     Diagnosis or treatment significantly limited by social determinants of health - None     23 minutes spent on the date of the encounter doing chart review, history and exam, documentation and further activities as noted above    The patient indicates understanding of these issues and agrees with the plan.    Follow up: 2-3 months     Mickey Washington-CHUCHO Zamorano  [facility]   Giselle is a 44 year old, presenting for the following health issues:  Weight Check        11/6/2024     9:54 AM   Additional Questions   Roomed by fatou   Accompanied by self     History of Present Illness       Reason for visit:  Discuss weight loss goals & medication    She eats 4 or more  servings of fruits and vegetables daily.She consumes 1 sweetened beverage(s) daily.She exercises with enough effort to increase her heart rate 30 to 60 minutes per day.  She exercises with enough effort to increase her heart rate 5 days per week.   She is taking medications regularly.     - Down about 4 lbs     About 0.6 lbs per week   - No side effects anymore, had some in the beginning     - Down almost 30 lbs since trying, 18 since June      PCP is pushing a GLP-1 medication           Review of Systems  Constitutional, neuro, ENT, endocrine, pulmonary, cardiac, gastrointestinal, genitourinary, musculoskeletal, integument and psychiatric systems are negative, except as otherwise noted.      Objective         Physical Exam   GENERAL: alert and no distress  EYES: Eyes grossly normal to inspection.  No discharge or erythema, or obvious scleral/conjunctival abnormalities.  RESP: No audible wheeze, cough, or visible cyanosis.    SKIN: Visible skin clear. No significant rash, abnormal pigmentation or lesions.  NEURO: Cranial nerves grossly intact.  Mentation and speech appropriate for age.  PSYCH: Appropriate affect, tone, and pace of words    Diagnostics: none           Video-Visit Details    Type of service:  Video Visit   Originating Location (pt. Location): Home  Distant Location (provider location):  Off-site  Platform used for Video Visit: Gautam  Signed Electronically by: Michelle Mane PA-C

## 2024-11-18 ENCOUNTER — TELEPHONE (OUTPATIENT)
Dept: GASTROENTEROLOGY | Facility: CLINIC | Age: 44
End: 2024-11-18
Payer: COMMERCIAL

## 2024-11-18 NOTE — TELEPHONE ENCOUNTER
Caller: moni    Reason for Reschedule/Cancellation   (please be detailed, any staff messages or encounters to note?): Pre assessment call not returned.       Prior to reschedule please review:  Ordering Provider:     Gregor Benavides MD in [facility] FP/IM/PEDS     Sedation Determined: moderate  Does patient have any ASC Exclusions, please identify?: n      Notes on Cancelled Procedure:  Procedure: Lower Endoscopy [Colonoscopy]   Date: 11/22/2024  Location: [facility]; [address] [place] [address] [address], [place] [address]   Surgeon: Arnold      Rescheduled: No, CASE IN DEPOT        Did you cancel or rescheduled an EUS procedure? No.

## 2025-04-02 NOTE — PATIENT INSTRUCTIONS
If you have labs or imaging done, the results will automatically release in SnapOne without an interpretation.  Your health care professional will review those results and send an interpretation with recommendations as soon as possible, but this may be 1-3 business days.    If you have any questions regarding your visit, please contact your care team.     [employer] Access Services: [phone]  [facility] CLINIC HOURS TELEPHONE NUMBER   Huyen Brown, ALONDRA Figueroa-GRETTA Arcos-GRETTA Raymond-Surgery Scheduler  Jo-       Monday- [place]  8:00 am-4:00 pm    Tuesday- [address]  8:00 am-4:00 pm    Wednesday- [place] 8:00 am-4:00 pm    Thursday- [address] 8:00 am-4:00 pm    Friday- [place]  8:00 am-4:00 pm [facility]  [address] [place]  [place] [address]  PH: [phone]  Fax: [phone]    Imaging Scheduling all locations  PH: [phone]     [facility]  [address] [place] [address] Dr.  [place], [place] [address] [phone]    [facility]  [address] [place] [address]  PH: [phone]     **Surgeries** Our Surgery Schedulers will contact you to schedule. If you do not receive a call within 3 business days, please call [phone].  Urgent Care locations:  [facility]       Monday-Friday   10 am - 8 pm    Saturday and Sunday   9 am - 5 pm   [phone]   If you need a medication refill, please contact your pharmacy. Please allow 3 business days for your refill to be completed.  As always, Thank you for trusting us with your healthcare needs!

## 2025-04-03 ENCOUNTER — OFFICE VISIT (OUTPATIENT)
Dept: OBGYN | Facility: CLINIC | Age: 45
End: 2025-04-03
Payer: COMMERCIAL

## 2025-04-03 VITALS
BODY MASS INDEX: 39.34 KG/M2 | HEIGHT: 62 IN | DIASTOLIC BLOOD PRESSURE: 79 MMHG | HEART RATE: 89 BPM | OXYGEN SATURATION: 96 % | WEIGHT: 213.8 LBS | SYSTOLIC BLOOD PRESSURE: 136 MMHG

## 2025-04-03 DIAGNOSIS — Z12.4 CERVICAL CANCER SCREENING: Primary | ICD-10-CM

## 2025-04-03 NOTE — PROGRESS NOTES
"Subjective:  Giselle is a 45 year old   is here today with the following concerns:    Vaginal exam: she has noticed that when she goes through airport security, her vagina always \"lights up on the radar\" and she needs to have an exam. She is wondering if she has developed vulvar or vaginal cancer and that is why it is lighting up. She denies any vaginal itching, discharge, pain, fever. Not sexually active x 7 years. Regular menstrual cycles. Just starting period today.  Pap: due      ROS: Pertinent ROS as above.    Medical history  OB History    Para Term  AB Living   2 2 2 0 0 2   SAB IAB Ectopic Multiple Live Births   0 0 0 0 2      # Outcome Date GA Lbr Cristian/2nd Weight Sex Type Anes PTL Lv   2 Term 03/15/04 40w0d  3.289 kg (7 lb 4 oz) F    [place]      Name: Angelika      Apgar1: 8  Apgar5: 9   1 Term 02 40w1d 10:00 3.402 kg (7 lb 8 oz) F    [place]      Name: Joaquina      Apgar1: 7  Apgar5: 8      Past Medical History:   Diagnosis Date    Malignant melanoma (H)     Migraine without aura       Past Surgical History:   Procedure Laterality Date    TONSILLECTOMY      ZZC STABISM SURG,PREV EYE SURG,NOT MUSC         ALL/Meds: Her medication and allergy histories were reviewed and are documented in their appropriate chart areas.    SH: Reviewed and documented in the appropriate area of the chart.  FH:  Her family history is reviewed and updated in the chart, today.  PMH: Her past medical, surgical, and obstetric histories were reviewed and updated today in the appropriate chart areas.    Objective:  PE: /79 (BP Location: Left arm, Patient Position: Sitting, Cuff Size: Adult Large)   Pulse 89   Ht 1.562 m (5' 1.5\")   Wt 97 kg (213 lb 12.8 oz)   LMP 2025 (Within Days)   SpO2 96%   BMI 39.74 kg/m    Body mass index is 39.74 kg/m .    Pertinent Physical exam findings:    GENERAL: Active, alert, in no acute distress.  SKIN: Clear. No significant rash, abnormal pigmentation or " lesions  MS: no gross musculoskeletal defects noted, no edema  PSYCH: Age-appropriate alertness and orientation    Pelvic:       - Ext: Vulva and perineum are normal without lesion, mass or discharge        - Urethra: normal without discharge or scarring        - Bladder: no tenderness, no masses       - Vagina: rugated and blood in vault       - Cervix: normal, multiparous, and pink, closed, neg CMT       - Uterus: Normal shape, position and consistency       - Adnexa: Normal without masses or tenderness    A/P:  iGselle Sauer is a 45 year old  here today with the following concerns:  (Z12.4) Cervical cancer screening  (primary encounter diagnosis)  Plan: HPV and Gynecologic Cytology Panel -         Recommended Age 30 - 65 Years           -Informed patient of normal PE findings. Discussed warning signs of vulvar/vaginal cancer and also advised that if she ever becomes anxious or symptomatic in the future, she is welcome to return for vaginal exam and further discussion.     She is agreeable to the plan above and has no further questions or concerns. She did not request a chaperone for the physical exam component of the visit today.     22 minutes spent on the date of the encounter doing chart review, patient visit, and documentation      KARYNA Gilman CNP

## 2025-04-08 LAB
BKR AP ASSOCIATED HPV REPORT: NORMAL
BKR LAB AP GYN ADEQUACY: NORMAL
BKR LAB AP GYN INTERPRETATION: NORMAL
BKR LAB AP PREVIOUS ABNORMAL: NORMAL
PATH REPORT.COMMENTS IMP SPEC: NORMAL
PATH REPORT.COMMENTS IMP SPEC: NORMAL
PATH REPORT.RELEVANT HX SPEC: NORMAL

## 2025-04-09 PROBLEM — R87.612 LGSIL OF CERVIX OF UNDETERMINED SIGNIFICANCE: Status: RESOLVED | Noted: 2020-07-21 | Resolved: 2020-07-21

## 2025-04-15 ENCOUNTER — TELEPHONE (OUTPATIENT)
Dept: GASTROENTEROLOGY | Facility: CLINIC | Age: 45
End: 2025-04-15
Payer: COMMERCIAL

## 2025-04-15 NOTE — TELEPHONE ENCOUNTER
"Endoscopy Scheduling Screen    Caller: patient    Have you had any respiratory illness or flu-like symptoms in the last 10 days?  No    What is your communication preference for Instructions and/or Bowel Prep?   MyChart    What insurance is in the chart?  Other:  [employer]    Ordering/Referring Provider: STEPHAN SAINZ    (If ordering provider performs procedure, schedule with ordering provider unless otherwise instructed. )    BMI: Estimated body mass index is 39.74 kg/m  as calculated from the following:    Height as of 4/3/25: 1.562 m (5' 1.5\").    Weight as of 4/3/25: 97 kg (213 lb 12.8 oz).     Sedation Ordered  moderate sedation.   If patient BMI > 50 do not schedule in ASC.    If patient BMI > 45 do not schedule at [facility].    Are you taking methadone or Suboxone?  NO, No RN review required.    Have you been diagnosed and are being treated for severe PTSD or severe anxiety?  NO, No RN review required.    Are you taking any prescription medications for pain 3 or more times per week?   NO, No RN review required.    Do you have a history of malignant hyperthermia?  No    (Females) Are you currently pregnant?   No     Have you been diagnosed or told you have pulmonary hypertension?   No    Do you have an LVAD?  No    Have you been told you have moderate to severe sleep apnea?  No.    Have you been told you have COPD, asthma, or any other lung disease?  No    Has your doctor ordered any cardiac tests like echo, angiogram, stress test, ablation, or EKG, that you have not completed yet?  No    Do you  have a history of any heart conditions?  No     Have you ever had or are you waiting for an organ transplant?  No. Continue scheduling, no site restrictions.    Have you had a stroke or transient ischemic attack (TIA aka \"mini stroke\") in the last 2 years?   No.    Have you been diagnosed with or been told you have cirrhosis of the liver?   No.    Are you currently on dialysis?   No    Do you need assistance " "transferring?   No    BMI: Estimated body mass index is 39.74 kg/m  as calculated from the following:    Height as of 4/3/25: 1.562 m (5' 1.5\").    Weight as of 4/3/25: 97 kg (213 lb 12.8 oz).     Is patients BMI > 40 and scheduling location UP?  No    Do you take an injectable or oral medication for weight loss or diabetes (excluding insulin)?  Yes, hold time can be up to 7 days. Please consult with you prescribing provider to discuss endoscopy recommendations. (Please schedule at least 7 days out.)    Do you take the medication Naltrexone?  No    Do you take blood thinners?  No       Prep   Are you currently on dialysis or do you have chronic kidney disease?  No    Do you have a diagnosis of diabetes?  No    Do you have a diagnosis of cystic fibrosis (CF)?  No    On a regular basis do you go 3 -5 days between bowel movements?  No    BMI > 40?  No    Preferred Pharmacy[facility]        [facility] [place] [facility] [address] [facility]  [address] [facility] [address]  Phone: [phone] Fax: [phone]      Final Scheduling Details     Procedure scheduled  Colonoscopy    Surgeon:  KATELYN     Date of procedure:  5/1/25     Pre-OP / PAC:   No - Not required for this site.    Location  [facility] - [facility] - Patient preference.    Sedation   Moderate Sedation - Per order.      Patient Reminders:   You will receive a call from a Nurse to review instructions and health history.  This assessment must be completed prior to your procedure.  Failure to complete the Nurse assessment may result in the procedure being cancelled.      On the day of your procedure, please designate an adult(s) who can drive you home stay with you for the next 24 hours. The medicines used in the exam will make you sleepy. You will not be able to drive.      You cannot take public transportation, ride share services, or non-medical taxi service without a responsible caregiver.  Medical transport services are allowed with the requirement " that a responsible caregiver will receive you at your destination.  We require that drivers and caregivers are confirmed prior to your procedure.

## 2025-04-16 ENCOUNTER — TELEPHONE (OUTPATIENT)
Dept: GASTROENTEROLOGY | Facility: CLINIC | Age: 45
End: 2025-04-16
Payer: COMMERCIAL

## 2025-04-16 DIAGNOSIS — Z12.11 SPECIAL SCREENING FOR MALIGNANT NEOPLASMS, COLON: Primary | ICD-10-CM

## 2025-04-16 RX ORDER — BISACODYL 5 MG/1
TABLET, DELAYED RELEASE ORAL
Qty: 4 TABLET | Refills: 0 | Status: SHIPPED | OUTPATIENT
Start: 2025-04-16

## 2025-04-16 NOTE — TELEPHONE ENCOUNTER
Pre visit planning completed.      Procedure details:    Patient scheduled for Colonoscopy on 5/1/25.     Approximate arrival time: 1115. Procedure time 1200.   *Ensure patient is aware that endoscopy team will be calling about 2 days prior to procedure date to confirm arrival time as this may change.     Facility location: [facility] [employer]; [address], [place] [address]. Check in location: 2nd Floor at Surgery desk.  *Disclaimer: Drivers are to check in with patient and stay on campus during procedure.     Sedation type: Conscious sedation     Pre op exam needed? No.    Indication for procedure: Screening       Chart review:     Electronic implanted devices? No    Recent diagnosis of diverticulitis within the last 6 weeks? No      Medication review:    Diabetic? Prediabetic. Not on diabetic medications. Pt answered yes to below question, please clarify  Do you take an injectable or oral medication for weight loss or diabetes (excluding insulin)?  Yes, hold time can be up to 7 days. Please consult with you prescribing provider to discuss endoscopy recommendations. (Please schedule at least 7 days out.)    Anticoagulants? No    Weight loss medication/injectable? No GLP-1 medication per patient's medication list. Nursing to verify with pre-assessment call.    Other medication HOLDING recommendations:  N/A      Prep for procedure:     Bowel prep recommendation: Extended Golytely. Bowel prep sent to    [facility] [address] [place] [address]  Due to: BMI > 40    Procedure information and instructions sent via royal Olson RN  Endoscopy Procedure Pre Assessment   [phone] option 3

## 2025-04-16 NOTE — TELEPHONE ENCOUNTER
Pre assessment completed for upcoming procedure.   (Please see previous telephone encounter notes for complete details)    Patient returned call.       Procedure details:    Approximate time and facility location reviewed.   Patient is aware that endoscopy team will be calling about 2 days prior to confirm arrival time.    Designated  policy reviewed and that site requests drivers to check in and stay on campus. Instructed to have someone stay 6  hours post procedure.   *Disclaimer - please notify the [facility] RN GI staff with any  issues/concerns.    Medication review:    Medications reviewed. Please see supporting documentation below. Holding recommendations discussed (if applicable).       Prep for procedure:     Procedure prep instructions reviewed.        Any additional information needed:  N/A      Patient verbalized understanding and had no questions or concerns at this time.      Marianne Reyes LPN  Endoscopy Procedure Pre Assessment   [phone] option 3

## 2025-04-16 NOTE — TELEPHONE ENCOUNTER
Attempted to contact patient in order to complete pre assessment questions.     No answer. Left message to return call to [phone] option 3.    Callback communication sent via [employer].    Marianne Reyes LPN

## 2025-04-17 NOTE — TELEPHONE ENCOUNTER
Received incoming call from patient.    Patient inquiring about arrival time changing and what that means. Writer discussed [facility] policy and patient stated that they would not be able to come any earlier due to a work conflict but they would be able to come later.    Patient stated that they would not want to reschedule 2 days out if this wasn't a possibility so they would rather reschedule now if they had to. Patient stated they really do not want to reschedule procedure but would if the needed to. Writer discussed that they would send a message to scheduling and ask if patient's procedure could be noted as unable to move up. Writer requested scheduling call to reschedule patient if this was not possible.     Patient verbalized understanding and had no further questions.    Lizy Alvarez, RN  Endoscopy Procedure Pre Assessment RN  [phone] option 3

## 2025-04-24 ENCOUNTER — ANCILLARY PROCEDURE (OUTPATIENT)
Dept: CT IMAGING | Facility: CLINIC | Age: 45
End: 2025-04-24
Attending: INTERNAL MEDICINE
Payer: COMMERCIAL

## 2025-04-24 DIAGNOSIS — Z13.6 SCREENING, ISCHEMIC HEART DISEASE: ICD-10-CM

## 2025-04-24 PROCEDURE — 75571 CT HRT W/O DYE W/CA TEST: CPT | Mod: GA | Performed by: STUDENT IN AN ORGANIZED HEALTH CARE EDUCATION/TRAINING PROGRAM

## 2025-05-06 ENCOUNTER — TELEPHONE (OUTPATIENT)
Dept: GASTROENTEROLOGY | Facility: CLINIC | Age: 45
End: 2025-05-06
Payer: COMMERCIAL

## 2025-05-06 NOTE — TELEPHONE ENCOUNTER
Left voicemail of arrival time of 8:45 AM.     [employer] message sent with updated arrival time.

## 2025-05-08 ENCOUNTER — HOSPITAL ENCOUNTER (OUTPATIENT)
Facility: AMBULATORY SURGERY CENTER | Age: 45
Discharge: HOME | End: 2025-05-08
Attending: STUDENT IN AN ORGANIZED HEALTH CARE EDUCATION/TRAINING PROGRAM | Admitting: STUDENT IN AN ORGANIZED HEALTH CARE EDUCATION/TRAINING PROGRAM
Payer: COMMERCIAL

## 2025-05-08 VITALS
SYSTOLIC BLOOD PRESSURE: 128 MMHG | HEART RATE: 80 BPM | DIASTOLIC BLOOD PRESSURE: 79 MMHG | TEMPERATURE: 98.1 F | OXYGEN SATURATION: 99 % | RESPIRATION RATE: 16 BRPM

## 2025-05-08 LAB — COLONOSCOPY: NORMAL

## 2025-05-08 RX ORDER — NALOXONE HYDROCHLORIDE 0.4 MG/ML
0.2 INJECTION, SOLUTION INTRAMUSCULAR; INTRAVENOUS; SUBCUTANEOUS
OUTPATIENT
Start: 2025-05-08

## 2025-05-08 RX ORDER — NALOXONE HYDROCHLORIDE 0.4 MG/ML
0.4 INJECTION, SOLUTION INTRAMUSCULAR; INTRAVENOUS; SUBCUTANEOUS
OUTPATIENT
Start: 2025-05-08

## 2025-05-08 RX ORDER — NALOXONE HYDROCHLORIDE 0.4 MG/ML
0.2 INJECTION, SOLUTION INTRAMUSCULAR; INTRAVENOUS; SUBCUTANEOUS
Status: ACTIVE | OUTPATIENT
Start: 2025-05-08

## 2025-05-08 RX ORDER — NALOXONE HYDROCHLORIDE 0.4 MG/ML
0.4 INJECTION, SOLUTION INTRAMUSCULAR; INTRAVENOUS; SUBCUTANEOUS
Status: ACTIVE | OUTPATIENT
Start: 2025-05-08

## 2025-05-08 RX ORDER — SIMETHICONE 40MG/0.6ML
133 SUSPENSION, DROPS(FINAL DOSAGE FORM)(ML) ORAL
Status: ACTIVE | OUTPATIENT
Start: 2025-05-08

## 2025-05-08 RX ORDER — ONDANSETRON 2 MG/ML
4 INJECTION INTRAMUSCULAR; INTRAVENOUS
Status: COMPLETED | OUTPATIENT
Start: 2025-05-08 | End: 2025-05-08

## 2025-05-08 RX ORDER — ATROPINE SULFATE 0.1 MG/ML
1 INJECTION INTRAVENOUS
Status: DISPENSED | OUTPATIENT
Start: 2025-05-08

## 2025-05-08 RX ORDER — FENTANYL CITRATE 50 UG/ML
25-100 INJECTION, SOLUTION INTRAMUSCULAR; INTRAVENOUS EVERY 5 MIN PRN
Refills: 0 | Status: ACTIVE | OUTPATIENT
Start: 2025-05-08

## 2025-05-08 RX ORDER — FLUMAZENIL 0.1 MG/ML
0.2 INJECTION, SOLUTION INTRAVENOUS
Status: ACTIVE | OUTPATIENT
Start: 2025-05-08

## 2025-05-08 RX ORDER — LIDOCAINE 40 MG/G
CREAM TOPICAL
Status: DISPENSED | OUTPATIENT
Start: 2025-05-08

## 2025-05-08 RX ORDER — EPINEPHRINE 1 MG/ML
0.1 INJECTION, SOLUTION INTRAMUSCULAR; SUBCUTANEOUS
Status: ACTIVE | OUTPATIENT
Start: 2025-05-08

## 2025-05-08 RX ORDER — PROCHLORPERAZINE MALEATE 10 MG
10 TABLET ORAL EVERY 6 HOURS PRN
OUTPATIENT
Start: 2025-05-08

## 2025-05-08 RX ORDER — FLUMAZENIL 0.1 MG/ML
0.2 INJECTION, SOLUTION INTRAVENOUS
OUTPATIENT
Start: 2025-05-08 | End: 2025-05-08

## 2025-05-08 RX ORDER — ONDANSETRON 4 MG/1
4 TABLET, ORALLY DISINTEGRATING ORAL EVERY 6 HOURS PRN
OUTPATIENT
Start: 2025-05-08

## 2025-05-08 RX ORDER — ONDANSETRON 2 MG/ML
4 INJECTION INTRAMUSCULAR; INTRAVENOUS EVERY 6 HOURS PRN
OUTPATIENT
Start: 2025-05-08

## 2025-05-08 RX ORDER — DIPHENHYDRAMINE HYDROCHLORIDE 50 MG/ML
25-50 INJECTION, SOLUTION INTRAMUSCULAR; INTRAVENOUS
Status: ACTIVE | OUTPATIENT
Start: 2025-05-08

## 2025-05-08 RX ADMIN — ONDANSETRON 4 MG: 2 INJECTION INTRAMUSCULAR; INTRAVENOUS at 09:32

## 2025-05-08 NOTE — H&P
Pre-Endoscopy History and Physical     Giselle Sauer MRN# [id number]   YOB: 1980 Age: 45 year old     Date of Procedure: 05/08/25  Primary care provider: Gregor Benavides  Type of Endoscopy: Colonoscopy  Reason for Procedure: Screening  Type of Anesthesia Anticipated: Moderate Sedation    HPI:    Giselle is a 45 year old female who will be undergoing the above procedure.      Prior colonoscopy: none    A history and physical has been performed, notable for none. The patient's medications and allergies have been reviewed. The risks and benefits of the procedure and the sedation options and risks were discussed with the patient.  All questions were answered and informed consent was obtained.      Symptoms:  Rectal bleeding: No  Irregular bowel habits: No  Abnormal weight loss: No  Changes in stool: No    She denies a personal or family history of anesthesia complications or bleeding disorders. Denies family history of CRC or IBD.    Allergies   Allergen Reactions    Sulfa Antibiotics Hives and Swelling     Swelling-throat      Allergy to Latex: NO   Allergy to tape: NO   Intolerances: NO     Current Outpatient Medications   Medication Sig Dispense Refill    ASPIRIN 81 PO Take by mouth daily       bisacodyl (DULCOLAX) 5 MG EC tablet Two days prior to exam take two (2) tablets at 4pm. One day prior to exam take two (2) tablets at 4pm 4 tablet 0    norethindrone (MICRONOR) 0.35 MG tablet Take 1 tablet (0.35 mg) by mouth daily. 84 tablet 3    polyethylene glycol (GOLYTELY) 236 g suspension Two days before procedure at 5PM fill first container with water. Mix and drink an 8 oz glass every 15 minutes until HALF of the container is gone. Place the remainder in the refrigerator. One day before procedure at 5PM drink second half of bowel prep. Drink an 8 oz glass every 15 minutes until it is gone. Day of procedure 6 hours before arrival time fill the 2nd container with water. Mix and drink an 8 oz glass every  15 minutes until HALF of the container is gone. Discard the remaining solution. 8000 mL 0    topiramate (TOPAMAX) 100 MG tablet TAKE 1.5 TABLETS BY MOUTH DAILY 135 tablet 3     Current Facility-Administered Medications   Medication Dose Route Frequency Provider Last Rate Last Admin    atropine injection 1 mg  1 mg Intravenous Once PRN Zoë Salazar MD        benzocaine 20% (HURRICAINE/TOPEX) 20 % spray 0.5 mL  1 spray Mouth/Throat Once PRN Zoë Salazar MD        diphenhydrAMINE (BENADRYL) injection 25-50 mg  25-50 mg Intravenous Once PRN Zoë Salazar MD        EPINEPHrine (Anaphylaxis) (ADRENALIN) injection (vial) 0.1 mg  0.1 mg Submucosal Once PRN Zoë Salazar MD        fentaNYL (PF) (SUBLIMAZE) injection  mcg   mcg Intravenous Q5 Min PRN Zoë Salazar MD        flumazenil (ROMAZICON) injection 0.2 mg  0.2 mg Intravenous q1 min prn Zoë Salazar MD        glucagon injection 0.5 mg  0.5 mg Intravenous Once PRN Zoë Salazar MD        lidocaine (LMX4) kit   Topical Q1H PRN Zoë Salazar MD        lidocaine 1 % 0.1-1 mL  0.1-1 mL Other Q1H PRN Zoë Salazar MD        midazolam (VERSED) injection 0.5-2 mg  0.5-2 mg Intravenous Q4 Min PRN Zoë Salazar MD        naloxone (NARCAN) injection 0.2 mg  0.2 mg Intravenous Q2 Min PRN Zoë Salazar MD        Or    naloxone (NARCAN) injection 0.4 mg  0.4 mg Intravenous Q2 Min PRZoë Zhang MD        Or    naloxone (NARCAN) injection 0.2 mg  0.2 mg Intramuscular Q2 Min PRN Zoë Salazar MD        Or    naloxone (NARCAN) injection 0.4 mg  0.4 mg Intramuscular Q2 Min PRZoë Zhang MD        simethicone (MYLICON) suspension 133 mg  133 mg Oral Once PRN Zoë Salazar MD        sodium chloride (PF) 0.9% PF flush 3 mL  3 mL Intracatheter Q8H [facility] Zoë Salazar MD        sodium chloride (PF) 0.9% PF flush 3 mL  3 mL Intracatheter q1 min  "Ming Cabrera MD        sodium chloride (PF) 0.9% PF flush 3 mL  3 mL Intravenous q1 min Ming Cabrera MD        sodium chloride 0.9% BOLUS 500 mL  500 mL Intravenous Once Ming Cabrera MD           Patient Active Problem List   Diagnosis    CARDIOVASCULAR SCREENING; LDL GOAL LESS THAN 160    Obesity    Malignant melanoma (H)        Past Medical History:   Diagnosis Date    Malignant melanoma (H)     Migraine without aura         Past Surgical History:   Procedure Laterality Date    TONSILLECTOMY      ZZC STABISM SURG,PREV EYE SURG,NOT MUSC         Social History     Tobacco Use    Smoking status: Never     Passive exposure: Never    Smokeless tobacco: Never   Substance Use Topics    Alcohol use: No       Family History   Problem Relation Age of Onset    Lipids Mother     Diabetes Maternal Grandmother        REVIEW OF SYSTEMS:     5 point ROS negative except as noted above in HPI, including Gen., Resp., CV, GI &  system review.      PHYSICAL EXAM:   /77   Temp 98.1  F (36.7  C) (Temporal)   Resp 18   LMP 03/02/2025 (Within Days)   SpO2 99%  Estimated body mass index is 39.74 kg/m  as calculated from the following:    Height as of 4/3/25: 1.562 m (5' 1.5\").    Weight as of 4/3/25: 97 kg (213 lb 12.8 oz).   All Vitals have been reviewed.    GENERAL APPEARANCE: healthy and alert  MENTAL STATUS: alert  AIRWAY EXAM: Mallampatti Class II (visualization of the soft palate, fauces, and uvula)  RESP: lungs clear to auscultation - no rales, rhonchi or wheezes  CV: regular rates and rhythm      IMPRESSION   ASA Class 2 - Mild systemic disease        PLAN:     Plan for colonoscopy. We discussed the risks, benefits and alternatives and the patient wished to proceed.    The above has been forwarded to the consulting provider.      MING ZEPEDA MD  [facility]  Phone: [phone]  Fax: [phone]  May 8, 2025    "

## 2025-05-22 ENCOUNTER — RESULTS FOLLOW-UP (OUTPATIENT)
Dept: GASTROENTEROLOGY | Facility: CLINIC | Age: 45
End: 2025-05-22

## (undated) RX ORDER — FENTANYL CITRATE 50 UG/ML
INJECTION, SOLUTION INTRAMUSCULAR; INTRAVENOUS
Status: DISPENSED
Start: 2025-05-08

## (undated) RX ORDER — ONDANSETRON 2 MG/ML
INJECTION INTRAMUSCULAR; INTRAVENOUS
Status: DISPENSED
Start: 2025-05-08